# Patient Record
Sex: MALE | Race: WHITE | NOT HISPANIC OR LATINO | Employment: FULL TIME | URBAN - METROPOLITAN AREA
[De-identification: names, ages, dates, MRNs, and addresses within clinical notes are randomized per-mention and may not be internally consistent; named-entity substitution may affect disease eponyms.]

---

## 2018-11-13 ENCOUNTER — OFFICE VISIT CONVERTED (OUTPATIENT)
Dept: SURGERY | Facility: CLINIC | Age: 43
End: 2018-11-13
Attending: SURGERY

## 2018-11-13 ENCOUNTER — CONVERSION ENCOUNTER (OUTPATIENT)
Dept: SURGERY | Facility: CLINIC | Age: 43
End: 2018-11-13

## 2020-01-27 ENCOUNTER — OFFICE VISIT CONVERTED (OUTPATIENT)
Dept: FAMILY MEDICINE CLINIC | Facility: CLINIC | Age: 45
End: 2020-01-27
Attending: PHYSICIAN ASSISTANT

## 2020-01-27 ENCOUNTER — CONVERSION ENCOUNTER (OUTPATIENT)
Dept: FAMILY MEDICINE CLINIC | Facility: CLINIC | Age: 45
End: 2020-01-27

## 2020-01-30 ENCOUNTER — HOSPITAL ENCOUNTER (OUTPATIENT)
Dept: GENERAL RADIOLOGY | Facility: HOSPITAL | Age: 45
Discharge: HOME | End: 2020-01-30
Attending: PHYSICIAN ASSISTANT

## 2020-11-06 ENCOUNTER — HOSPITAL ENCOUNTER (OUTPATIENT)
Dept: URGENT CARE | Facility: CLINIC | Age: 45
Discharge: HOME | End: 2020-11-06

## 2021-04-05 ENCOUNTER — HOSPITAL ENCOUNTER (OUTPATIENT)
Dept: URGENT CARE | Facility: CLINIC | Age: 46
Discharge: HOME | End: 2021-04-05
Attending: FAMILY MEDICINE

## 2021-05-15 VITALS
BODY MASS INDEX: 30.88 KG/M2 | TEMPERATURE: 98 F | HEIGHT: 72 IN | SYSTOLIC BLOOD PRESSURE: 154 MMHG | WEIGHT: 228 LBS | DIASTOLIC BLOOD PRESSURE: 82 MMHG | HEART RATE: 112 BPM | OXYGEN SATURATION: 99 %

## 2021-05-16 VITALS — BODY MASS INDEX: 33.89 KG/M2 | RESPIRATION RATE: 18 BRPM | WEIGHT: 250.25 LBS | HEIGHT: 72 IN

## 2021-08-20 PROCEDURE — U0003 INFECTIOUS AGENT DETECTION BY NUCLEIC ACID (DNA OR RNA); SEVERE ACUTE RESPIRATORY SYNDROME CORONAVIRUS 2 (SARS-COV-2) (CORONAVIRUS DISEASE [COVID-19]), AMPLIFIED PROBE TECHNIQUE, MAKING USE OF HIGH THROUGHPUT TECHNOLOGIES AS DESCRIBED BY CMS-2020-01-R: HCPCS | Performed by: NURSE PRACTITIONER

## 2021-08-24 ENCOUNTER — TELEPHONE (OUTPATIENT)
Dept: OTHER | Facility: OTHER | Age: 46
End: 2021-08-24

## 2022-02-08 PROCEDURE — U0004 COV-19 TEST NON-CDC HGH THRU: HCPCS | Performed by: NURSE PRACTITIONER

## 2022-02-09 ENCOUNTER — TELEPHONE (OUTPATIENT)
Dept: URGENT CARE | Facility: CLINIC | Age: 47
End: 2022-02-09

## 2022-02-09 NOTE — TELEPHONE ENCOUNTER
----- Message from ANJU Matute sent at 2/9/2022  9:39 AM EST -----  Please notify patient of negative Covid result.

## 2023-11-14 ENCOUNTER — TELEPHONE (OUTPATIENT)
Dept: FAMILY MEDICINE CLINIC | Facility: CLINIC | Age: 48
End: 2023-11-14
Payer: COMMERCIAL

## 2023-11-14 NOTE — TELEPHONE ENCOUNTER
Left VM for patient to call back.      Unable to see at our office for WORKMANS COMP, we can only bill primary insurance. He will have to be seen elsewhere for workmans comp claim

## 2023-11-14 NOTE — TELEPHONE ENCOUNTER
Caller: Hong Fraser    Relationship: Self    Best call back number: [phone]    What is the best time to reach you: ANYTIME    Who are you requesting to speak with (clinical staff, provider,  specific staff member): NURSE     What was the call regarding: PATIENT IS A NEW PATIENT AND IT IS A WORKMANS COMP. PATIENT REQUESTED TO SEE JACKSON IQRA BECAUSE WORKMAN'S COMP WAS SENDING HIM TO A DOCTOR AND HE WENT TO SCHOOL WITH PCP.

## 2023-11-15 NOTE — TELEPHONE ENCOUNTER
Patient called back.  I explained we would only be able to bill his primary insurance.  He states this is ok with him and he would like to keep his appointment as scheduled.

## 2023-11-17 ENCOUNTER — TELEPHONE (OUTPATIENT)
Dept: FAMILY MEDICINE CLINIC | Facility: CLINIC | Age: 48
End: 2023-11-17
Payer: COMMERCIAL

## 2023-11-17 ENCOUNTER — OFFICE VISIT (OUTPATIENT)
Dept: FAMILY MEDICINE CLINIC | Facility: CLINIC | Age: 48
End: 2023-11-17
Payer: COMMERCIAL

## 2023-11-17 VITALS
BODY MASS INDEX: 33.62 KG/M2 | SYSTOLIC BLOOD PRESSURE: 173 MMHG | WEIGHT: 248.2 LBS | OXYGEN SATURATION: 99 % | DIASTOLIC BLOOD PRESSURE: 95 MMHG | HEART RATE: 86 BPM | TEMPERATURE: 98 F | HEIGHT: 72 IN

## 2023-11-17 DIAGNOSIS — Z76.89 ESTABLISHING CARE WITH NEW DOCTOR, ENCOUNTER FOR: Primary | ICD-10-CM

## 2023-11-17 DIAGNOSIS — M54.2 CERVICAL PAIN: ICD-10-CM

## 2023-11-17 DIAGNOSIS — Z79.4 TYPE 2 DIABETES MELLITUS WITH HYPERGLYCEMIA, WITH LONG-TERM CURRENT USE OF INSULIN: Primary | ICD-10-CM

## 2023-11-17 DIAGNOSIS — E78.5 HYPERLIPIDEMIA, UNSPECIFIED HYPERLIPIDEMIA TYPE: ICD-10-CM

## 2023-11-17 DIAGNOSIS — M54.50 LUMBAR PAIN: ICD-10-CM

## 2023-11-17 DIAGNOSIS — I10 ESSENTIAL HYPERTENSION: ICD-10-CM

## 2023-11-17 DIAGNOSIS — F17.220 CHEWING TOBACCO NICOTINE DEPENDENCE WITHOUT COMPLICATION: ICD-10-CM

## 2023-11-17 DIAGNOSIS — Z12.5 SCREENING FOR PROSTATE CANCER: ICD-10-CM

## 2023-11-17 DIAGNOSIS — E11.65 TYPE 2 DIABETES MELLITUS WITH HYPERGLYCEMIA, WITH LONG-TERM CURRENT USE OF INSULIN: Primary | ICD-10-CM

## 2023-11-17 DIAGNOSIS — E11.65 TYPE 2 DIABETES MELLITUS WITH HYPERGLYCEMIA, WITH LONG-TERM CURRENT USE OF INSULIN: ICD-10-CM

## 2023-11-17 DIAGNOSIS — M54.6 THORACIC BACK PAIN, UNSPECIFIED BACK PAIN LATERALITY, UNSPECIFIED CHRONICITY: ICD-10-CM

## 2023-11-17 DIAGNOSIS — Z12.11 SCREENING FOR COLON CANCER: ICD-10-CM

## 2023-11-17 DIAGNOSIS — Z79.4 TYPE 2 DIABETES MELLITUS WITH HYPERGLYCEMIA, WITH LONG-TERM CURRENT USE OF INSULIN: ICD-10-CM

## 2023-11-17 DIAGNOSIS — E66.9 OBESITY (BMI 30-39.9): ICD-10-CM

## 2023-11-17 RX ORDER — CYCLOBENZAPRINE HCL 10 MG
10 TABLET ORAL 3 TIMES DAILY PRN
Qty: 30 TABLET | Refills: 2 | Status: SHIPPED | OUTPATIENT
Start: 2023-11-17

## 2023-11-17 RX ORDER — PROCHLORPERAZINE 25 MG/1
1 SUPPOSITORY RECTAL DAILY
Qty: 1 EACH | Refills: 0 | Status: SHIPPED | OUTPATIENT
Start: 2023-11-17

## 2023-11-17 RX ORDER — FAMOTIDINE 20 MG/1
TABLET, FILM COATED ORAL
COMMUNITY
Start: 2023-09-11 | End: 2023-11-17

## 2023-11-17 RX ORDER — PROCHLORPERAZINE 25 MG/1
SUPPOSITORY RECTAL
Qty: 3 EACH | Refills: 1 | Status: SHIPPED | OUTPATIENT
Start: 2023-11-17

## 2023-11-17 RX ORDER — METOPROLOL SUCCINATE 100 MG/1
50 TABLET, EXTENDED RELEASE ORAL DAILY
COMMUNITY
End: 2023-11-17 | Stop reason: DRUGHIGH

## 2023-11-17 RX ORDER — METHOCARBAMOL 750 MG/1
750 TABLET, FILM COATED ORAL 4 TIMES DAILY PRN
Qty: 40 TABLET | Refills: 3 | Status: SHIPPED | OUTPATIENT
Start: 2023-11-17

## 2023-11-17 RX ORDER — PROCHLORPERAZINE 25 MG/1
SUPPOSITORY RECTAL
COMMUNITY
Start: 2023-11-08 | End: 2023-11-17 | Stop reason: SDUPTHER

## 2023-11-17 RX ORDER — LISINOPRIL 40 MG/1
40 TABLET ORAL DAILY
COMMUNITY
End: 2023-11-17 | Stop reason: SDUPTHER

## 2023-11-17 RX ORDER — ATORVASTATIN CALCIUM 20 MG/1
20 TABLET, FILM COATED ORAL DAILY
Qty: 90 TABLET | Refills: 3 | Status: SHIPPED | OUTPATIENT
Start: 2023-11-17

## 2023-11-17 RX ORDER — LISINOPRIL 40 MG/1
40 TABLET ORAL DAILY
Qty: 90 TABLET | Refills: 3 | Status: SHIPPED | OUTPATIENT
Start: 2023-11-17

## 2023-11-17 RX ORDER — ATORVASTATIN CALCIUM 20 MG/1
20 TABLET, FILM COATED ORAL DAILY
COMMUNITY
End: 2023-11-17 | Stop reason: SDUPTHER

## 2023-11-17 RX ORDER — METOPROLOL SUCCINATE 50 MG/1
50 TABLET, EXTENDED RELEASE ORAL 2 TIMES DAILY
Qty: 60 TABLET | Refills: 5 | Status: SHIPPED | OUTPATIENT
Start: 2023-11-17

## 2023-11-17 RX ORDER — PROCHLORPERAZINE 25 MG/1
SUPPOSITORY RECTAL
COMMUNITY
Start: 2023-11-07 | End: 2023-11-17 | Stop reason: SDUPTHER

## 2023-11-17 RX ORDER — DIPHENHYDRAMINE HCL 25 MG
CAPSULE ORAL
COMMUNITY
Start: 2023-09-11 | End: 2023-11-17

## 2023-11-17 RX ORDER — TESTOSTERONE 20.25 MG/1.25G
GEL TOPICAL
COMMUNITY
Start: 2023-09-11

## 2023-11-17 RX ORDER — DICLOFENAC SODIUM 75 MG/1
75 TABLET, DELAYED RELEASE ORAL 2 TIMES DAILY
Qty: 60 TABLET | Refills: 2 | Status: SHIPPED | OUTPATIENT
Start: 2023-11-17

## 2023-11-17 NOTE — ASSESSMENT & PLAN NOTE
Patient's (Body mass index is 33.66 kg/m².) indicates that they are obese (BMI >30) with health conditions that include hypertension and diabetes mellitus . Weight is newly identified. BMI  is above average; BMI management plan is completed. We discussed portion control and increasing exercise.
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
## 2023-11-17 NOTE — ASSESSMENT & PLAN NOTE
Hypertension is  newly identified, elevated .  Continue current treatment regimen.  Dietary sodium restriction.  Weight loss.  Regular aerobic exercise.  Continue current medications.  Ambulatory blood pressure monitoring.  Blood pressure will be reassessed at the next regular appointment. Keep all FU with cardiology.
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
## 2023-11-17 NOTE — ASSESSMENT & PLAN NOTE
Flexeril 10mg PO tid prn may take at night and when not working   Robaxin 750mg PO qid prn   Diclofenac 75mg PO bid   Referral to neurosurgeon   Increase rest   Ice/heat 20 min TID prn   Consider PT

## 2023-11-17 NOTE — ASSESSMENT & PLAN NOTE
Diabetes is newly identified.   Continue current treatment regimen.  Diabetes will be reassessed in 3 months.
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
## 2023-11-17 NOTE — TELEPHONE ENCOUNTER
Caller: Hong Fraser    Relationship: Self    Best call back number: [phone]     Requested Prescriptions:   Requested Prescriptions      No prescriptions requested or ordered in this encounter      HUMALOG INSULIN VIALS FOR DIABETIC PUMP  QTY OF 3    HUMALOG INSULIN PENS  BOX    OMNIPODS - THE ACTUAL PODS  AT LEAST QTY OF 15 PER MONTH  90 DAY SUPPLY    Pharmacy where request should be sent: [facility] [address] - [phone]  - [phone]     Last office visit with prescribing clinician: 11/17/2023   Last telemedicine visit with prescribing clinician: Visit date not found   Next office visit with prescribing clinician: 12/1/2023     Additional details provided by patient: PATIENT WOULD LIKE 90 DAY SUPPLY ON ALL    Does the patient have less than a 3 day supply:  [] Yes  [x] No    Carolina Velazco Rep   11/17/23 12:13 EST

## 2023-11-17 NOTE — PATIENT INSTRUCTIONS
Type 2 Diabetes Mellitus, Self-Care, Adult  When you have type 2 diabetes (type 2 diabetes mellitus), you must make sure your blood sugar (glucose) stays in a healthy range. You can do this with:  Nutrition.  Exercise.  Lifestyle changes.  Medicines or insulin, if needed.  Support from your doctors and others.  What are the risks?  Having type 2 diabetes can raise your risk for other long-term (chronic) health problems. You may get medicines to help prevent these problems.  How to stay aware of your blood sugar    Check your blood sugar level every day, as often as told.  Have your A1C (hemoglobin A1C) level checked two or more times a year. Have it checked more often if told.  Your doctor will set personal treatment goals for you. In general, you should have these blood sugar levels:  Before meals:  mg/dL (4.4-7.2 mmol/L).  After meals: below 180 mg/dL (10 mmol/L).  A1C: less than 7%.  How to manage high and low blood sugar  Symptoms of high blood sugar  High blood sugar is also called hyperglycemia. Know the symptoms of high blood sugar. These may include:  More thirst.  Hunger.  Feeling very tired.  Needing to pee (urinate) more often than normal.  Seeing things blurry.  Symptoms of low blood sugar  Low blood sugar is also called hypoglycemia. This is when blood sugar is at or below 70 mg/dL (3.9 mmol/L). Symptoms may include:  Hunger.  Feeling worried or nervous (anxious).  Feeling sweaty and cold to the touch (clammy).  Being dizzy or light-headed.  Feeling sleepy.  A fast heartbeat.  Feeling grouchy (irritable).  Tingling or loss of feeling (numbness) around your mouth, lips, or tongue.  Restless sleep.  Diabetes medicines can cause low blood sugar. You are more at risk:  While you exercise.  After exercise.  During sleep.  When you are sick.  When you skip meals or do not eat for a long time.  Treating low blood sugar  If you think you have low blood sugar, eat or drink something sugary right away. Keep  15 grams of a fast-acting carb (carbohydrate) with you all the time. Make sure your family and friends know how to treat you if you cannot treat yourself.  Treating very low blood sugar  Severe hypoglycemia is when your blood sugar is at or below 54 mg/dL (3 mmol/L).  Severe hypoglycemia is an emergency. Get medical help right away. Call your local emergency services (911 in the [place]  Do not wait to see if the symptoms will go away.  Do not drive yourself to the hospital.  You may need a glucagon shot if you have very low blood sugar and you cannot eat or drink. Have a family member or friend learn how to check your blood sugar and how to give you a glucagon shot. Ask your doctor if you should have a kit for glucagon shots.  Follow these instructions at home:  Medicines  Take prescribed insulin or diabetes medicines as told by your health care provider.  Do not run out of insulin or other medicines. Plan ahead.  If you use insulin, change the amount you take based on how active you are and what foods you eat. Your doctor will tell you how to do this.  Take over-the-counter and prescription medicines only as told by your doctor.  Eating and drinking    Eat healthy foods. These include:  Low-fat (lean) proteins.  Complex carbs, such as whole grains.  Fresh fruits and vegetables.  Low-fat dairy products.  Healthy fats.  Meet with a food expert (dietitian) to make an eating plan.  Follow instructions from your doctor about what you cannot eat or drink.  Drink enough fluid to keep your pee (urine) pale yellow.  Keep track of carbs that you eat. Read food labels and learn serving sizes of foods.  Follow your sick-day plan when you cannot eat or drink as normal. Make this plan with your doctor so it is ready to use.  Activity  Exercise as told by your doctor. You may need to:  Do stretching and strength exercises two or more times a week.  Do 150 minutes or more of exercise each week that makes your heart beat faster and  makes you sweat.  Spread out your exercise over 3 or more days a week.  Do not go more than 2 days in a row without exercise.  Talk with your doctor before you start a new exercise. Your doctor may tell you to change:  How much insulin or medicines you take.  How much food you eat.  Lifestyle  Do not smoke or use any products that contain nicotine or tobacco. If you need help quitting, ask your doctor.  If you drink alcohol and your doctor says that it is safe for you:  Limit how much you have to:  0-1 drink a day for women who are not pregnant.  0-2 drinks a day for men.  Know how much alcohol is in your drink. In the [place] one drink equals one 12 oz bottle of beer (355 mL), one 5 oz glass of wine (148 mL), or one 1½ oz glass of hard liquor (44 mL).  Learn to deal with stress. If you need help, ask your doctor.  Body care    Stay up to date with your shots (immunizations).  Have your eyes and feet checked by a doctor as often as told.  Check your skin and feet every day. Check for cuts, bruises, redness, blisters, or sores.  Brush your teeth and gums two times a day. Floss one or more times a day.  Go to the dentist one or more times every 6 months.  Stay at a healthy weight.  General instructions  Share your diabetes care plan with:  Your work or school.  People you live with.  Carry a card or wear jewelry that says you have diabetes.  Keep all follow-up visits.  Questions to ask your doctor  Do I need to meet with a certified expert in diabetes education and care?  Where can I find a support group?  Where to find more information  For help and guidance and more information about diabetes, please go to:  American Diabetes Association: www.diabetes.org  American Association of Diabetes Care and Education Specialists: www.diabeteseducator.org  International Diabetes Federation: www.idf.org  Summary  When you have type 2 diabetes, you must make sure your blood sugar (glucose) stays in a healthy range. You can do this  with nutrition, exercise, medicines and insulin, and support from doctors and others.  Check your blood sugar every day, or as often as told.  Having diabetes can raise your risk for other long-term health problems. You may get medicines to help prevent these problems.  Share your diabetes management plan with people at work, school, and home.  Keep all follow-up visits.  This information is not intended to replace advice given to you by your health care provider. Make sure you discuss any questions you have with your health care provider.  Document Revised: 03/14/2022 Document Reviewed: 03/14/2022  Elsevier Patient Education © 2023 Elsevier Inc.

## 2023-11-20 ENCOUNTER — TELEPHONE (OUTPATIENT)
Dept: FAMILY MEDICINE CLINIC | Facility: CLINIC | Age: 48
End: 2023-11-20
Payer: COMMERCIAL

## 2023-11-20 ENCOUNTER — LAB (OUTPATIENT)
Dept: LAB | Facility: HOSPITAL | Age: 48
End: 2023-11-20
Payer: COMMERCIAL

## 2023-11-20 DIAGNOSIS — E11.65 TYPE 2 DIABETES MELLITUS WITH HYPERGLYCEMIA, WITH LONG-TERM CURRENT USE OF INSULIN: ICD-10-CM

## 2023-11-20 DIAGNOSIS — Z12.5 SCREENING FOR PROSTATE CANCER: ICD-10-CM

## 2023-11-20 DIAGNOSIS — Z79.4 TYPE 2 DIABETES MELLITUS WITH HYPERGLYCEMIA, WITH LONG-TERM CURRENT USE OF INSULIN: ICD-10-CM

## 2023-11-20 LAB
ALBUMIN UR-MCNC: 153 MG/DL
BASOPHILS # BLD AUTO: 0.04 10*3/MM3 (ref 0–0.2)
BASOPHILS NFR BLD AUTO: 0.6 % (ref 0–1.5)
CREAT UR-MCNC: 234.9 MG/DL
DEPRECATED RDW RBC AUTO: 37.8 FL (ref 37–54)
EOSINOPHIL # BLD AUTO: 0.13 10*3/MM3 (ref 0–0.4)
EOSINOPHIL NFR BLD AUTO: 2 % (ref 0.3–6.2)
ERYTHROCYTE [DISTWIDTH] IN BLOOD BY AUTOMATED COUNT: 12.6 % (ref 12.3–15.4)
HCT VFR BLD AUTO: 45.7 % (ref 37.5–51)
HGB BLD-MCNC: 15.9 G/DL (ref 13–17.7)
IMM GRANULOCYTES # BLD AUTO: 0.03 10*3/MM3 (ref 0–0.05)
IMM GRANULOCYTES NFR BLD AUTO: 0.5 % (ref 0–0.5)
LYMPHOCYTES # BLD AUTO: 2.01 10*3/MM3 (ref 0.7–3.1)
LYMPHOCYTES NFR BLD AUTO: 30.5 % (ref 19.6–45.3)
MCH RBC QN AUTO: 29.3 PG (ref 26.6–33)
MCHC RBC AUTO-ENTMCNC: 34.8 G/DL (ref 31.5–35.7)
MCV RBC AUTO: 84.3 FL (ref 79–97)
MICROALBUMIN/CREAT UR: 651.3 MG/G (ref 0–29)
MONOCYTES # BLD AUTO: 0.48 10*3/MM3 (ref 0.1–0.9)
MONOCYTES NFR BLD AUTO: 7.3 % (ref 5–12)
NEUTROPHILS NFR BLD AUTO: 3.9 10*3/MM3 (ref 1.7–7)
NEUTROPHILS NFR BLD AUTO: 59.1 % (ref 42.7–76)
NRBC BLD AUTO-RTO: 0 /100 WBC (ref 0–0.2)
PLATELET # BLD AUTO: 282 10*3/MM3 (ref 140–450)
PMV BLD AUTO: 10.4 FL (ref 6–12)
RBC # BLD AUTO: 5.42 10*6/MM3 (ref 4.14–5.8)
WBC NRBC COR # BLD AUTO: 6.59 10*3/MM3 (ref 3.4–10.8)

## 2023-11-20 PROCEDURE — 82043 UR ALBUMIN QUANTITATIVE: CPT

## 2023-11-20 PROCEDURE — 80061 LIPID PANEL: CPT

## 2023-11-20 PROCEDURE — G0103 PSA SCREENING: HCPCS

## 2023-11-20 PROCEDURE — 85025 COMPLETE CBC W/AUTO DIFF WBC: CPT

## 2023-11-20 PROCEDURE — 84681 ASSAY OF C-PEPTIDE: CPT

## 2023-11-20 PROCEDURE — 84443 ASSAY THYROID STIM HORMONE: CPT

## 2023-11-20 PROCEDURE — 36415 COLL VENOUS BLD VENIPUNCTURE: CPT

## 2023-11-20 PROCEDURE — 83036 HEMOGLOBIN GLYCOSYLATED A1C: CPT

## 2023-11-20 PROCEDURE — 82570 ASSAY OF URINE CREATININE: CPT

## 2023-11-20 PROCEDURE — 80053 COMPREHEN METABOLIC PANEL: CPT
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
## 2023-11-21 ENCOUNTER — TELEPHONE (OUTPATIENT)
Dept: FAMILY MEDICINE CLINIC | Facility: CLINIC | Age: 48
End: 2023-11-21
Payer: COMMERCIAL

## 2023-11-21 DIAGNOSIS — Z79.4 TYPE 2 DIABETES MELLITUS WITH HYPERGLYCEMIA, WITH LONG-TERM CURRENT USE OF INSULIN: Primary | ICD-10-CM

## 2023-11-21 DIAGNOSIS — E11.65 TYPE 2 DIABETES MELLITUS WITH HYPERGLYCEMIA, WITH LONG-TERM CURRENT USE OF INSULIN: Primary | ICD-10-CM

## 2023-11-21 LAB
ALBUMIN SERPL-MCNC: 3.8 G/DL (ref 3.5–5.2)
ALBUMIN/GLOB SERPL: 1.4 G/DL
ALP SERPL-CCNC: 74 U/L (ref 39–117)
ALT SERPL W P-5'-P-CCNC: 31 U/L (ref 1–41)
ANION GAP SERPL CALCULATED.3IONS-SCNC: 12.5 MMOL/L (ref 5–15)
AST SERPL-CCNC: 38 U/L (ref 1–40)
BILIRUB SERPL-MCNC: 0.4 MG/DL (ref 0–1.2)
BUN SERPL-MCNC: 14 MG/DL (ref 6–20)
BUN/CREAT SERPL: 13.1 (ref 7–25)
CALCIUM SPEC-SCNC: 9.6 MG/DL (ref 8.6–10.5)
CHLORIDE SERPL-SCNC: 104 MMOL/L (ref 98–107)
CHOLEST SERPL-MCNC: 189 MG/DL (ref 0–200)
CO2 SERPL-SCNC: 23.5 MMOL/L (ref 22–29)
CREAT SERPL-MCNC: 1.07 MG/DL (ref 0.76–1.27)
EGFRCR SERPLBLD CKD-EPI 2021: 85.6 ML/MIN/1.73
GLOBULIN UR ELPH-MCNC: 2.8 GM/DL
GLUCOSE SERPL-MCNC: 81 MG/DL (ref 65–99)
HBA1C MFR BLD: 11.1 % (ref 4.8–5.6)
HDLC SERPL-MCNC: 44 MG/DL (ref 40–60)
LDLC SERPL CALC-MCNC: 121 MG/DL (ref 0–100)
LDLC/HDLC SERPL: 2.69 {RATIO}
POTASSIUM SERPL-SCNC: 4.2 MMOL/L (ref 3.5–5.2)
PROT SERPL-MCNC: 6.6 G/DL (ref 6–8.5)
PSA SERPL-MCNC: 0.72 NG/ML (ref 0–4)
SODIUM SERPL-SCNC: 140 MMOL/L (ref 136–145)
TRIGL SERPL-MCNC: 133 MG/DL (ref 0–150)
TSH SERPL DL<=0.05 MIU/L-ACNC: 0.8 UIU/ML (ref 0.27–4.2)
VLDLC SERPL-MCNC: 24 MG/DL (ref 5–40)

## 2023-11-21 RX ORDER — INSULIN PUMP CONTROLLER
1 EACH MISCELLANEOUS EVERY OTHER DAY
Qty: 15 EACH | Refills: 0 | Status: SHIPPED | OUTPATIENT
Start: 2023-11-21

## 2023-11-21 NOTE — TELEPHONE ENCOUNTER
Patient out of omnipods for insulin pump. Asking for month supply until appointment with Jenna Marie    Sending to [facility] pharmacy
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
## 2023-11-22 LAB — C PEPTIDE SERPL-MCNC: 0.9 NG/ML (ref 1.1–4.4)

## 2023-11-22 NOTE — TELEPHONE ENCOUNTER
Patient is going to be out of his Omnipod prior to his apt with davidson on 12/7/23. If you could send him in some to bridge him to his apt with Basia Marie until then please.

## 2023-11-28 DIAGNOSIS — Z79.4 TYPE 2 DIABETES MELLITUS WITH HYPERGLYCEMIA, WITH LONG-TERM CURRENT USE OF INSULIN: ICD-10-CM

## 2023-11-28 DIAGNOSIS — E11.65 TYPE 2 DIABETES MELLITUS WITH HYPERGLYCEMIA, WITH LONG-TERM CURRENT USE OF INSULIN: ICD-10-CM

## 2023-11-28 RX ORDER — INSULIN PUMP CONTROLLER
1 EACH MISCELLANEOUS EVERY OTHER DAY
Qty: 15 EACH | Refills: 5 | Status: SHIPPED | OUTPATIENT
Start: 2023-11-28

## 2023-12-01 ENCOUNTER — OFFICE VISIT (OUTPATIENT)
Dept: FAMILY MEDICINE CLINIC | Facility: CLINIC | Age: 48
End: 2023-12-01
Payer: COMMERCIAL

## 2023-12-01 VITALS
OXYGEN SATURATION: 98 % | HEART RATE: 81 BPM | SYSTOLIC BLOOD PRESSURE: 154 MMHG | WEIGHT: 252.2 LBS | BODY MASS INDEX: 34.16 KG/M2 | DIASTOLIC BLOOD PRESSURE: 81 MMHG | HEIGHT: 72 IN | TEMPERATURE: 98 F

## 2023-12-01 DIAGNOSIS — E78.5 HYPERLIPIDEMIA, UNSPECIFIED HYPERLIPIDEMIA TYPE: ICD-10-CM

## 2023-12-01 DIAGNOSIS — Z79.4 TYPE 2 DIABETES MELLITUS WITH HYPERGLYCEMIA, WITH LONG-TERM CURRENT USE OF INSULIN: ICD-10-CM

## 2023-12-01 DIAGNOSIS — F17.220 CHEWING TOBACCO NICOTINE DEPENDENCE WITHOUT COMPLICATION: ICD-10-CM

## 2023-12-01 DIAGNOSIS — M54.2 CERVICAL PAIN: Primary | ICD-10-CM

## 2023-12-01 DIAGNOSIS — M54.6 THORACIC BACK PAIN, UNSPECIFIED BACK PAIN LATERALITY, UNSPECIFIED CHRONICITY: ICD-10-CM

## 2023-12-01 DIAGNOSIS — M54.50 LUMBAR PAIN: ICD-10-CM

## 2023-12-01 DIAGNOSIS — E11.65 TYPE 2 DIABETES MELLITUS WITH HYPERGLYCEMIA, WITH LONG-TERM CURRENT USE OF INSULIN: ICD-10-CM

## 2023-12-01 DIAGNOSIS — E66.9 OBESITY (BMI 30-39.9): ICD-10-CM

## 2023-12-01 DIAGNOSIS — I10 ESSENTIAL HYPERTENSION: ICD-10-CM

## 2023-12-01 PROCEDURE — 1159F MED LIST DOCD IN RCRD: CPT | Performed by: NURSE PRACTITIONER

## 2023-12-01 PROCEDURE — 99214 OFFICE O/P EST MOD 30 MIN: CPT | Performed by: NURSE PRACTITIONER

## 2023-12-01 PROCEDURE — 3077F SYST BP >= 140 MM HG: CPT | Performed by: NURSE PRACTITIONER

## 2023-12-01 PROCEDURE — 3079F DIAST BP 80-89 MM HG: CPT | Performed by: NURSE PRACTITIONER

## 2023-12-01 PROCEDURE — 1160F RVW MEDS BY RX/DR IN RCRD: CPT | Performed by: NURSE PRACTITIONER

## 2023-12-01 PROCEDURE — 3046F HEMOGLOBIN A1C LEVEL >9.0%: CPT | Performed by: NURSE PRACTITIONER

## 2023-12-01 RX ORDER — PROCHLORPERAZINE 25 MG/1
1 SUPPOSITORY RECTAL DAILY
Qty: 1 EACH | Refills: 0 | Status: SHIPPED | OUTPATIENT
Start: 2023-12-01
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
## 2023-12-01 NOTE — ASSESSMENT & PLAN NOTE
Hypertension is  elevated in office . Pt states he has white coat syn. Advised he will check at home daily and let us know results.   Continue current treatment regimen.  Dietary sodium restriction.  Weight loss.  Regular aerobic exercise.  Ambulatory blood pressure monitoring.  Blood pressure will be reassessed at the next regular appointment.

## 2023-12-01 NOTE — ASSESSMENT & PLAN NOTE
Diabetes is improving with treatment.   Continue current treatment regimen.  Reminded to bring in blood sugar diary at next visit.  Dietary recommendations for ADA diet.  Regular aerobic exercise.  Diabetes will be reassessed in 3 months. Keep upcoming appt with endo.

## 2023-12-01 NOTE — ASSESSMENT & PLAN NOTE
Patient's (Body mass index is 34.2 kg/m².) indicates that they are obese (BMI >30) with health conditions that include hypertension and diabetes mellitus . Weight is newly identified. BMI  is above average; BMI management plan is completed. We discussed portion control and increasing exercise.

## 2023-12-06 ENCOUNTER — PATIENT ROUNDING (BHMG ONLY) (OUTPATIENT)
Dept: FAMILY MEDICINE CLINIC | Facility: CLINIC | Age: 48
End: 2023-12-06
Payer: COMMERCIAL

## 2023-12-06 NOTE — PROGRESS NOTES
A My-Chart message has been sent to the patient for PATIENT R[place]ING with [facility] [place].
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
## 2023-12-07 ENCOUNTER — TELEPHONE (OUTPATIENT)
Dept: DIABETES SERVICES | Facility: HOSPITAL | Age: 48
End: 2023-12-07
Payer: COMMERCIAL

## 2023-12-07 ENCOUNTER — OFFICE VISIT (OUTPATIENT)
Dept: DIABETES SERVICES | Facility: HOSPITAL | Age: 48
End: 2023-12-07
Payer: COMMERCIAL

## 2023-12-07 VITALS
DIASTOLIC BLOOD PRESSURE: 78 MMHG | HEART RATE: 86 BPM | HEIGHT: 72 IN | SYSTOLIC BLOOD PRESSURE: 169 MMHG | OXYGEN SATURATION: 99 % | BODY MASS INDEX: 33.46 KG/M2 | WEIGHT: 247 LBS

## 2023-12-07 DIAGNOSIS — E10.22 TYPE 1 DIABETES WITH STAGE 2 CHRONIC KIDNEY DISEASE GFR 60-89: ICD-10-CM

## 2023-12-07 DIAGNOSIS — E10.65 UNCONTROLLED TYPE 1 DIABETES MELLITUS WITH HYPERGLYCEMIA: ICD-10-CM

## 2023-12-07 DIAGNOSIS — N18.2 TYPE 1 DIABETES WITH STAGE 2 CHRONIC KIDNEY DISEASE GFR 60-89: ICD-10-CM

## 2023-12-07 DIAGNOSIS — Z97.8 USES SELF-APPLIED CONTINUOUS GLUCOSE MONITORING DEVICE: ICD-10-CM

## 2023-12-07 DIAGNOSIS — Z96.41 INSULIN PUMP IN PLACE: ICD-10-CM

## 2023-12-07 DIAGNOSIS — E66.9 OBESITY (BMI 30-39.9): ICD-10-CM

## 2023-12-07 DIAGNOSIS — E10.40 TYPE 1 DIABETES MELLITUS WITH DIABETIC NEUROPATHY: ICD-10-CM

## 2023-12-07 DIAGNOSIS — E13.9 LADA (LATENT AUTOIMMUNE DIABETES IN ADULTS), MANAGED AS TYPE 1: Primary | ICD-10-CM

## 2023-12-07 LAB — GLUCOSE BLDC GLUCOMTR-MCNC: 254 MG/DL (ref 70–99)

## 2023-12-07 PROCEDURE — 3077F SYST BP >= 140 MM HG: CPT | Performed by: NURSE PRACTITIONER

## 2023-12-07 PROCEDURE — 3046F HEMOGLOBIN A1C LEVEL >9.0%: CPT | Performed by: NURSE PRACTITIONER

## 2023-12-07 PROCEDURE — 99204 OFFICE O/P NEW MOD 45 MIN: CPT | Performed by: NURSE PRACTITIONER

## 2023-12-07 PROCEDURE — G0463 HOSPITAL OUTPT CLINIC VISIT: HCPCS | Performed by: NURSE PRACTITIONER

## 2023-12-07 PROCEDURE — 3078F DIAST BP <80 MM HG: CPT | Performed by: NURSE PRACTITIONER

## 2023-12-07 PROCEDURE — 95251 CONT GLUC MNTR ANALYSIS I&R: CPT | Performed by: NURSE PRACTITIONER

## 2023-12-07 PROCEDURE — 82948 REAGENT STRIP/BLOOD GLUCOSE: CPT | Performed by: NURSE PRACTITIONER

## 2023-12-07 PROCEDURE — 1159F MED LIST DOCD IN RCRD: CPT | Performed by: NURSE PRACTITIONER

## 2023-12-07 PROCEDURE — 1160F RVW MEDS BY RX/DR IN RCRD: CPT | Performed by: NURSE PRACTITIONER

## 2023-12-07 RX ORDER — INSULIN PMP CART,AUT,G6/7,CNTR
1 EACH SUBCUTANEOUS ONCE
Qty: 1 KIT | Refills: 0 | Status: SHIPPED | OUTPATIENT
Start: 2023-12-07 | End: 2023-12-07

## 2023-12-07 RX ORDER — INSULIN PMP CART,AUT,G6/7,CNTR
1 EACH SUBCUTANEOUS
Qty: 10 EACH | Refills: 5 | Status: SHIPPED | OUTPATIENT
Start: 2023-12-07

## 2023-12-07 RX ORDER — INSULIN LISPRO 200 [IU]/ML
150 INJECTION, SOLUTION SUBCUTANEOUS DAILY
Qty: 66 ML | Refills: 1 | Status: SHIPPED | OUTPATIENT
Start: 2023-12-07 | End: 2024-06-04

## 2023-12-07 NOTE — TELEPHONE ENCOUNTER
LIBRA FRYE Key: [id number] - [place] Case ID: [id number] - Rx #: [id number]  HumaLOG KwikPen 200UNIT/ML pen-injectorsApprovedtoday  The request has been approved. The authorization is effective from 12/07/2023 to 12/06/2024, as long as the member is enrolled in their current health plan. The request was approved as submitted. A written notification letter will follow with additional details.

## 2023-12-12 ENCOUNTER — OFFICE VISIT (OUTPATIENT)
Dept: NEUROSURGERY | Facility: CLINIC | Age: 48
End: 2023-12-12
Payer: OTHER MISCELLANEOUS

## 2023-12-12 VITALS
HEART RATE: 97 BPM | SYSTOLIC BLOOD PRESSURE: 155 MMHG | DIASTOLIC BLOOD PRESSURE: 83 MMHG | BODY MASS INDEX: 33.86 KG/M2 | HEIGHT: 72 IN | WEIGHT: 250 LBS

## 2023-12-12 DIAGNOSIS — M47.816 LUMBAR FACET ARTHROPATHY: ICD-10-CM

## 2023-12-12 DIAGNOSIS — M43.16 SPONDYLOLISTHESIS AT L4-L5 LEVEL: ICD-10-CM

## 2023-12-12 DIAGNOSIS — M51.36 DDD (DEGENERATIVE DISC DISEASE), LUMBAR: Primary | ICD-10-CM

## 2023-12-12 DIAGNOSIS — M43.06 PARS DEFECT OF LUMBAR SPINE: ICD-10-CM

## 2023-12-12 DIAGNOSIS — M54.2 CERVICAL PAIN: ICD-10-CM

## 2023-12-12 DIAGNOSIS — M50.30 DDD (DEGENERATIVE DISC DISEASE), CERVICAL: ICD-10-CM

## 2023-12-12 RX ORDER — DICLOFENAC SODIUM 30 MG/G
GEL TOPICAL 2 TIMES DAILY
Qty: 100 G | Refills: 2 | Status: SHIPPED | OUTPATIENT
Start: 2023-12-12

## 2023-12-12 RX ORDER — IBUPROFEN 800 MG/1
800 TABLET ORAL EVERY 8 HOURS PRN
Qty: 90 TABLET | Refills: 2 | Status: SHIPPED | OUTPATIENT
Start: 2023-12-12
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
## 2023-12-13 ENCOUNTER — PATIENT ROUNDING (BHMG ONLY) (OUTPATIENT)
Dept: NEUROSURGERY | Facility: CLINIC | Age: 48
End: 2023-12-13
Payer: COMMERCIAL

## 2023-12-13 ENCOUNTER — TELEPHONE (OUTPATIENT)
Dept: NEUROSURGERY | Facility: OTHER | Age: 48
End: 2023-12-13
Payer: COMMERCIAL

## 2023-12-13 NOTE — TELEPHONE ENCOUNTER
GUADALUPE FROM [facility]/  CALLED IN AND STATED THAT SHE ALSO NEEDS THE ORDERS FOR PT AND PM AS WELL AS THE MRI CERVICAL AND LUMBAR-     FAX-  [phone]- ATTN- [id number]   [phone]     CAN BE SENT ASAP- MRI'S WILL NEED TO BE SCHEDULED THROUGH [facility]    PLEASE ADVISE- THANK YOU.

## 2023-12-13 NOTE — TELEPHONE ENCOUNTER
Caller: GUADALUPE    Relationship: Payer- [employer]/ [employer]    Best call back number: [phone]    What form or medical record are you requesting: LAST OV NOTES- 12/12/23- WORK STATUS    Who is requesting this form or medical record from you: [employer]    How would you like to receive the form or medical records (pick-up, mail, fax): FAX  If fax, what is the fax number: [phone]- ATTN- [id number]    Timeframe paperwork needed: ASAP    PLEASE ADVISE- THANK YOU

## 2023-12-15 NOTE — TELEPHONE ENCOUNTER
ERICA WITH [employer] THEY DO THE SCHEDULING FOR WORK COMP CLAIMS STATES THEY NEED THE MRI L SPINE ORDER FAXED TO THEM TO FAX #[phone]    STATES THEY RECEIVED THE MRI C SPINE ORDER    PLEASE CALL ERICA WITH ANY QUESTIONS AT PHONE NUMBER [phone]

## 2023-12-15 NOTE — TELEPHONE ENCOUNTER
Caller: GUADALUPE    Relationship to patient: Payer    Best call back number: [phone]    Patient is needing: GUADALUPE WITH [employer] CALLED, STATES THEY NEED A MEDICAL NECESSITY LETTER STATING WHY PATIENT NEEDS AQUATIC THERAPY VERSUS REGULAR THERAPY. GUADALUPE ALSO STATES SHE IS NEEDING THE WORK STATUS FORM FILLED OUT, THEY CANNOT ACCEPT THE LAST OV NOTE AS THE WORK STATUS FORM. PLEASE REVIEW, PLEASE CALL GUADALUPE WITH ANY QUESTIONS.    THANK YOU

## 2023-12-21 ENCOUNTER — TELEPHONE (OUTPATIENT)
Dept: NEUROSURGERY | Facility: CLINIC | Age: 48
End: 2023-12-21
Payer: COMMERCIAL

## 2023-12-21 ENCOUNTER — TELEPHONE (OUTPATIENT)
Dept: DIABETES SERVICES | Facility: HOSPITAL | Age: 48
End: 2023-12-21
Payer: COMMERCIAL

## 2023-12-21 NOTE — TELEPHONE ENCOUNTER
I telephoned patient and left voicemail regarding Omnipod 5 insulin pump. I explained he needed to open the box and follow the instructions and place the code august in when it asked for a code. I left my call back number.

## 2023-12-21 NOTE — TELEPHONE ENCOUNTER
Patient left a voicemail stating MRI has been scheduled at [facility]. He stated they did not have the order for the flexion/extension x-rays, and requested that be faxed. I have faxed it to [phone].

## 2023-12-27 ENCOUNTER — EDUCATION (OUTPATIENT)
Dept: DIABETES SERVICES | Facility: HOSPITAL | Age: 48
End: 2023-12-27
Payer: COMMERCIAL

## 2023-12-27 DIAGNOSIS — E10.9 DIABETES MELLITUS TYPE 1, CONTROLLED, WITHOUT COMPLICATIONS: Primary | ICD-10-CM

## 2023-12-27 NOTE — PROGRESS NOTES
Hong Fraser 48 y.o. presents for OmniPod insulin pump instructions. Insulin pump training check list completed.  Patient stated they have viewed some of the insulin pump videos.  Patient was able to maneuver pump screens without difficulty.  Patient applied insulin pump insertion site without difficulty 15-15 rule for treatment of low blood glucose was reviewed with patient. Patient was explained to always have back up supplies including blood glucose meter, strips, lancets, basal insulin, short acting insulin pens or vials with insulin syringes and emergency glucagon. Patient was given DSME staff contact information and encouraged patient to contact staff with questions or concerns.    Insulin pump settings are as follows:  12 AM to 12 AM basal rate 1.85 units/h, target blood glucose 120 correction factor I unit per 70 points above target, insulin carb ratio 1 unit per 8 carbs.  Duration of insulin action 3 hours, maximum bolus 25 and maximum basal rate 3 units/h    Time started: 9:30    Time ended: 11:00    Amy Fernandez [facility], ALICE, [facility]12/27/2023

## 2023-12-28 DIAGNOSIS — M43.16 SPONDYLOLISTHESIS AT L4-L5 LEVEL: ICD-10-CM

## 2023-12-28 DIAGNOSIS — M43.06 PARS DEFECT OF LUMBAR SPINE: ICD-10-CM

## 2023-12-29 ENCOUNTER — PATIENT MESSAGE (OUTPATIENT)
Dept: NEUROSURGERY | Facility: CLINIC | Age: 48
End: 2023-12-29
Payer: COMMERCIAL

## 2023-12-29 ENCOUNTER — TELEPHONE (OUTPATIENT)
Dept: NEUROSURGERY | Facility: CLINIC | Age: 48
End: 2023-12-29
Payer: COMMERCIAL

## 2024-01-02 DIAGNOSIS — M50.30 DDD (DEGENERATIVE DISC DISEASE), CERVICAL: ICD-10-CM

## 2024-01-02 DIAGNOSIS — M47.816 LUMBAR FACET ARTHROPATHY: ICD-10-CM

## 2024-01-02 DIAGNOSIS — M54.2 CERVICAL PAIN: ICD-10-CM

## 2024-01-02 DIAGNOSIS — M43.06 PARS DEFECT OF LUMBAR SPINE: ICD-10-CM

## 2024-01-02 DIAGNOSIS — M51.36 DDD (DEGENERATIVE DISC DISEASE), LUMBAR: ICD-10-CM

## 2024-01-02 DIAGNOSIS — M43.16 SPONDYLOLISTHESIS AT L4-L5 LEVEL: ICD-10-CM

## 2024-01-03 ENCOUNTER — TELEPHONE (OUTPATIENT)
Dept: NEUROSURGERY | Facility: OTHER | Age: 49
End: 2024-01-03
Payer: COMMERCIAL

## 2024-01-03 NOTE — TELEPHONE ENCOUNTER
Caller: GUADALUPE    Relationship: Payer    Best call back number: [phone]    What form or medical record are you requesting: PAIN MANAGEMENT TREATMENT PLAN - TOO LIGHT- NEEDS TO BE DARKENED. - MEDICAL NECESSITY FORM FOR AQUA THERAPY INSTEAD OF PHYSICAL THERAPY-    Who is requesting this form or medical record from you: YESSICA W/PEPPER    How would you like to receive the form or medical records (pick-up, mail, fax): FAX  If fax, what is the fax number: [phone] ATTN- [id number]      Timeframe paperwork needed: ASAP    Additional notes: SHE STATED SHE RECEIVED THE REQUEST ON 12/13/23 AND SHE HAS BEEN TRYING TO OBTAIN THESE SINCE-    PLEASE ADVISE- THANK YOU.

## 2024-01-04 ENCOUNTER — TELEPHONE (OUTPATIENT)
Dept: DIABETES SERVICES | Facility: HOSPITAL | Age: 49
End: 2024-01-04

## 2024-01-04 ENCOUNTER — OFFICE VISIT (OUTPATIENT)
Dept: DIABETES SERVICES | Facility: HOSPITAL | Age: 49
End: 2024-01-04
Payer: COMMERCIAL

## 2024-01-04 VITALS
HEART RATE: 92 BPM | BODY MASS INDEX: 35.21 KG/M2 | OXYGEN SATURATION: 97 % | SYSTOLIC BLOOD PRESSURE: 180 MMHG | HEIGHT: 72 IN | WEIGHT: 260 LBS | DIASTOLIC BLOOD PRESSURE: 94 MMHG

## 2024-01-04 DIAGNOSIS — Z96.41 INSULIN PUMP IN PLACE: ICD-10-CM

## 2024-01-04 DIAGNOSIS — E13.40: ICD-10-CM

## 2024-01-04 DIAGNOSIS — E13.29 LATENT AUTOIMMUNE DIABETES MELLITUS IN ADULT (LADA) WITH MICROALBUMINURIA: ICD-10-CM

## 2024-01-04 DIAGNOSIS — Z97.8 USES SELF-APPLIED CONTINUOUS GLUCOSE MONITORING DEVICE: ICD-10-CM

## 2024-01-04 DIAGNOSIS — E66.01 SEVERE OBESITY (BMI 35.0-39.9) WITH COMORBIDITY: ICD-10-CM

## 2024-01-04 DIAGNOSIS — Z46.81 INSULIN PUMP TITRATION: ICD-10-CM

## 2024-01-04 DIAGNOSIS — R80.9 LATENT AUTOIMMUNE DIABETES MELLITUS IN ADULT (LADA) WITH MICROALBUMINURIA: ICD-10-CM

## 2024-01-04 DIAGNOSIS — E13.22 LATENT AUTOIMMUNE DIABETES MELLITUS IN ADULT (LADA) WITH CHRONIC KIDNEY DISEASE: ICD-10-CM

## 2024-01-04 DIAGNOSIS — E13.9 LADA (LATENT AUTOIMMUNE DIABETES IN ADULTS), MANAGED AS TYPE 1: Primary | ICD-10-CM

## 2024-01-04 LAB
EXPIRATION DATE: ABNORMAL
GLUCOSE BLDC GLUCOMTR-MCNC: 102 MG/DL (ref 70–99)
HBA1C MFR BLD: 10.9 % (ref 4.5–5.7)
Lab: ABNORMAL

## 2024-01-04 PROCEDURE — 3080F DIAST BP >= 90 MM HG: CPT | Performed by: NURSE PRACTITIONER

## 2024-01-04 PROCEDURE — 1160F RVW MEDS BY RX/DR IN RCRD: CPT | Performed by: NURSE PRACTITIONER

## 2024-01-04 PROCEDURE — 3077F SYST BP >= 140 MM HG: CPT | Performed by: NURSE PRACTITIONER

## 2024-01-04 PROCEDURE — 99214 OFFICE O/P EST MOD 30 MIN: CPT | Performed by: NURSE PRACTITIONER

## 2024-01-04 PROCEDURE — 3046F HEMOGLOBIN A1C LEVEL >9.0%: CPT | Performed by: NURSE PRACTITIONER

## 2024-01-04 PROCEDURE — 95251 CONT GLUC MNTR ANALYSIS I&R: CPT | Performed by: NURSE PRACTITIONER

## 2024-01-04 PROCEDURE — G0463 HOSPITAL OUTPT CLINIC VISIT: HCPCS | Performed by: NURSE PRACTITIONER

## 2024-01-04 PROCEDURE — 82948 REAGENT STRIP/BLOOD GLUCOSE: CPT | Performed by: NURSE PRACTITIONER

## 2024-01-04 PROCEDURE — 1159F MED LIST DOCD IN RCRD: CPT | Performed by: NURSE PRACTITIONER

## 2024-01-04 RX ORDER — GLUCAGON INJECTION, SOLUTION 1 MG/.2ML
1 INJECTION, SOLUTION SUBCUTANEOUS AS NEEDED
Qty: 0.2 ML | Refills: 2 | Status: SHIPPED | OUTPATIENT
Start: 2024-01-04

## 2024-01-04 RX ORDER — SEMAGLUTIDE 0.68 MG/ML
0.5 INJECTION, SOLUTION SUBCUTANEOUS WEEKLY
Qty: 9 ML | Refills: 1 | Status: SHIPPED | OUTPATIENT
Start: 2024-01-04
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
## 2024-01-04 NOTE — TELEPHONE ENCOUNTER
Approvedtoday  The request has been approved. The authorization is effective from 01/04/2024 to 01/03/2025, as long as the member is enrolled in their current health plan. The request was approved as submitted. A written notification letter will follow with additional details.LIBRA FRYE Key: [id number] - [place] Case ID: [id number] - Rx #: [id number]  Gvoke HypoPen 2-Pack 1MG/0.2ML auto-injectors

## 2024-01-09 ENCOUNTER — OFFICE VISIT (OUTPATIENT)
Dept: NEUROSURGERY | Facility: CLINIC | Age: 49
End: 2024-01-09
Payer: OTHER MISCELLANEOUS

## 2024-01-09 VITALS
BODY MASS INDEX: 34.95 KG/M2 | DIASTOLIC BLOOD PRESSURE: 94 MMHG | HEIGHT: 72 IN | HEART RATE: 105 BPM | SYSTOLIC BLOOD PRESSURE: 174 MMHG | WEIGHT: 258 LBS

## 2024-01-09 DIAGNOSIS — M43.06 PARS DEFECT OF LUMBAR SPINE: ICD-10-CM

## 2024-01-09 DIAGNOSIS — M50.30 DDD (DEGENERATIVE DISC DISEASE), CERVICAL: ICD-10-CM

## 2024-01-09 DIAGNOSIS — M54.2 CERVICAL PAIN: ICD-10-CM

## 2024-01-09 DIAGNOSIS — M47.816 LUMBAR FACET ARTHROPATHY: ICD-10-CM

## 2024-01-09 DIAGNOSIS — M43.16 SPONDYLOLISTHESIS AT L4-L5 LEVEL: ICD-10-CM

## 2024-01-09 DIAGNOSIS — M51.36 DDD (DEGENERATIVE DISC DISEASE), LUMBAR: Primary | ICD-10-CM

## 2024-01-16 ENCOUNTER — TELEPHONE (OUTPATIENT)
Dept: FAMILY MEDICINE CLINIC | Facility: CLINIC | Age: 49
End: 2024-01-16
Payer: COMMERCIAL

## 2024-02-07 ENCOUNTER — TELEPHONE (OUTPATIENT)
Dept: DIABETES SERVICES | Facility: HOSPITAL | Age: 49
End: 2024-02-07
Payer: COMMERCIAL

## 2024-02-07 NOTE — TELEPHONE ENCOUNTER
Patient called into clinic and stated that he is getting a epidural tomorrow morning on 2-8-2024 at 8:10 in the morning and needs to know if any settings on omnipod need to be changed.

## 2024-02-15 ENCOUNTER — TELEPHONE (OUTPATIENT)
Dept: DIABETES SERVICES | Facility: HOSPITAL | Age: 49
End: 2024-02-15
Payer: COMMERCIAL

## 2024-02-22 ENCOUNTER — OFFICE VISIT (OUTPATIENT)
Dept: DIABETES SERVICES | Facility: HOSPITAL | Age: 49
End: 2024-02-22
Payer: COMMERCIAL

## 2024-02-22 VITALS
BODY MASS INDEX: 35.62 KG/M2 | DIASTOLIC BLOOD PRESSURE: 89 MMHG | SYSTOLIC BLOOD PRESSURE: 156 MMHG | WEIGHT: 263 LBS | OXYGEN SATURATION: 99 % | HEIGHT: 72 IN | HEART RATE: 87 BPM

## 2024-02-22 DIAGNOSIS — Z97.8 USES SELF-APPLIED CONTINUOUS GLUCOSE MONITORING DEVICE: ICD-10-CM

## 2024-02-22 DIAGNOSIS — Z96.41 INSULIN PUMP IN PLACE: ICD-10-CM

## 2024-02-22 DIAGNOSIS — Z46.81 INSULIN PUMP TITRATION: ICD-10-CM

## 2024-02-22 DIAGNOSIS — R80.9 LATENT AUTOIMMUNE DIABETES MELLITUS IN ADULT (LADA) WITH MICROALBUMINURIA: ICD-10-CM

## 2024-02-22 DIAGNOSIS — E13.9 LADA (LATENT AUTOIMMUNE DIABETES IN ADULTS), MANAGED AS TYPE 1: ICD-10-CM

## 2024-02-22 DIAGNOSIS — E10.65 UNCONTROLLED TYPE 1 DIABETES MELLITUS WITH HYPERGLYCEMIA: Primary | ICD-10-CM

## 2024-02-22 DIAGNOSIS — E13.29 LATENT AUTOIMMUNE DIABETES MELLITUS IN ADULT (LADA) WITH MICROALBUMINURIA: ICD-10-CM

## 2024-02-22 DIAGNOSIS — E13.40: ICD-10-CM

## 2024-02-22 DIAGNOSIS — E13.22 LATENT AUTOIMMUNE DIABETES MELLITUS IN ADULT (LADA) WITH CHRONIC KIDNEY DISEASE: ICD-10-CM

## 2024-02-22 LAB
EXPIRATION DATE: ABNORMAL
GLUCOSE BLDC GLUCOMTR-MCNC: 236 MG/DL (ref 70–99)
HBA1C MFR BLD: 8 % (ref 4.5–5.7)
Lab: ABNORMAL

## 2024-02-22 PROCEDURE — 3079F DIAST BP 80-89 MM HG: CPT | Performed by: NURSE PRACTITIONER

## 2024-02-22 PROCEDURE — 1160F RVW MEDS BY RX/DR IN RCRD: CPT | Performed by: NURSE PRACTITIONER

## 2024-02-22 PROCEDURE — G0463 HOSPITAL OUTPT CLINIC VISIT: HCPCS | Performed by: NURSE PRACTITIONER

## 2024-02-22 PROCEDURE — 3077F SYST BP >= 140 MM HG: CPT | Performed by: NURSE PRACTITIONER

## 2024-02-22 PROCEDURE — 99214 OFFICE O/P EST MOD 30 MIN: CPT | Performed by: NURSE PRACTITIONER

## 2024-02-22 PROCEDURE — 1159F MED LIST DOCD IN RCRD: CPT | Performed by: NURSE PRACTITIONER

## 2024-02-22 PROCEDURE — 95251 CONT GLUC MNTR ANALYSIS I&R: CPT | Performed by: NURSE PRACTITIONER

## 2024-02-22 PROCEDURE — 3052F HG A1C>EQUAL 8.0%<EQUAL 9.0%: CPT | Performed by: NURSE PRACTITIONER

## 2024-02-22 PROCEDURE — 82948 REAGENT STRIP/BLOOD GLUCOSE: CPT | Performed by: NURSE PRACTITIONER

## 2024-02-22 RX ORDER — MELOXICAM 15 MG/1
1 TABLET ORAL DAILY
COMMUNITY
Start: 2024-01-24

## 2024-04-18 DIAGNOSIS — M51.36 DDD (DEGENERATIVE DISC DISEASE), LUMBAR: ICD-10-CM

## 2024-04-18 RX ORDER — DICLOFENAC SODIUM 30 MG/G
GEL TOPICAL
Qty: 100 G | Refills: 2 | OUTPATIENT
Start: 2024-04-18

## 2024-04-29 RX ORDER — PROCHLORPERAZINE 25 MG/1
SUPPOSITORY RECTAL
Qty: 3 EACH | Refills: 1 | Status: SHIPPED | OUTPATIENT
Start: 2024-04-29

## 2024-05-20 NOTE — PROGRESS NOTES
Chief Complaint  Diabetes (Follow up, med mgt, A1c eval, cgm/pump eval )    Referred By: Krystyna Collins,*    Subjective          Hong John Evelio presents to [facility] for diabetes medication management    History of Present Illness    Visit type:  follow-up  Diabetes type:  Type 2  Current diabetes status/concerns/issues:  No concerns with his diabetes  Other health concerns:  He had some issues with pedal edema  Current Diabetes symptoms:    Polyuria: No   Polydipsia: No   Polyphagia: No   Blurred vision: No   Excessive fatigue: No  Known Diabetes complications:  Neuropathy: Numbness and Shooting Pain; Location: Feet  Renal: Stage II mild (GFR = 60-89 mL/min) and Microalbuminuria  Eyes: No current eye exam available in record and Other: 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  Amputation/Wounds: None  GI: None  Cardiovascular: Hypertension and Hyperlipidemia  ED: Patient Reported; he uses injection therapy  Other: None  Hypoglycemia:   He had a low event when he had lots of s/s of low but his glucose was in the 70s  Hypoglycemia Symptoms:  No hypoglycemia at this time  Current diabetes treatment:   Omnipod insulin pump using Humalog U200 insulin and Ozempic 0.5 mg once weekly   Blood glucose device:  Dexcom CGM  Blood glucose monitoring frequency:  Continuous per CGM  Blood glucose range/average:  The 14-day sensor report shows an average glucose of 210 mg/dL, with 36% in target range ( mgdL), 40% in the high range (181-250 mg/dL), 24% in the very high range (>250 mg/dL), 0% in the low range (54-70 mg/dL) and 0% in the very low range (<54 mg/dL).   Glucose Source: Device Reviewed  Diet:  Carbohydrate Counting, Limits high carb/sweet foods, Avoids sugary  drinks  Activity/Exercise:   active with work    Past Medical History:   Diagnosis Date    Diabetes mellitus     History of asthma     History of depression     Hyperlipidemia     Hypertension     Tachycardia      Past Surgical History:   Procedure Laterality Date    HAND SURGERY Left     x2; traumatic injury    HERNIA REPAIR      umbilical    LIPOMA EXCISION      right hip    WISDOM TOOTH EXTRACTION       Family History   Problem Relation Age of Onset    COPD Mother     Lung cancer Mother     Hyperlipidemia Father     Hypertension Father     Coronary artery disease Father     COPD Father     Diabetes Brother      Social History     Socioeconomic History    Marital status:     Number of children: 2   Tobacco Use    Smoking status: Former     Current packs/day: 1.00     Types: Cigarettes     Passive exposure: Never    Smokeless tobacco: Former    Tobacco comments:     pouches   Vaping Use    Vaping status: Never Used   Substance and Sexual Activity    Alcohol use: Yes     Alcohol/week: 1.0 standard drink of alcohol     Types: 1 Cans of beer per week     Comment: occasionally 1 beer a week    Drug use: Never    Sexual activity: Yes     Partners: Female     No Known Allergies    Current Outpatient Medications:     atorvastatin (LIPITOR) 20 MG tablet, Take 1 tablet by mouth Daily., Disp: 90 tablet, Rfl: 3    Continuous Blood Gluc Transmit (Dexcom G6 Transmitter) misc, Inject 1 each under the skin into the appropriate area as directed Daily., Disp: 1 each, Rfl: 0    Continuous Glucose Sensor (Dexcom G6 Sensor), Inject under the skin into the appropriate area as directed Every 10 (Ten) Days., Disp: 3 each, Rfl: 1    Diclofenac Sodium 3 % gel gel, Apply  topically to the appropriate area as directed 2 (Two) Times a Day., Disp: 100 g, Rfl: 2    Glucagon (Gvoke HypoPen 2-Pack) 1 MG/0.2ML solution auto-injector, Inject 1 mg under the skin into the appropriate area as directed As Needed (Severe hypoglycemia)., Disp: 0.2  "mL, Rfl: 2    ibuprofen (ADVIL,MOTRIN) 800 MG tablet, Take 1 tablet by mouth Every 8 (Eight) Hours As Needed for Mild Pain., Disp: 90 tablet, Rfl: 2    Insulin Dispos  Accessories (Omnipod Pod Pals) misc, Use 1 each Every 30 (Thirty) Days., Disp: 1 each, Rfl: 11    Insulin Disposable Pump (Omnipod 5 G6 Pod, Gen 5,) misc, Use 1 each Every 3 (Three) Days., Disp: 10 each, Rfl: 5    Insulin Lispro (HumaLOG KwikPen) 200 UNIT/ML solution pen-injector, Inject 150 Units under the skin into the appropriate area as directed Daily for 180 days., Disp: 66 mL, Rfl: 1    lisinopril (PRINIVIL,ZESTRIL) 40 MG tablet, Take 1 tablet by mouth Daily., Disp: 90 tablet, Rfl: 3    meloxicam (MOBIC) 15 MG tablet, Take 1 tablet by mouth Daily., Disp: , Rfl:     metoprolol succinate XL (TOPROL-XL) 50 MG 24 hr tablet, Take 1 tablet by mouth 2 (Two) Times a Day., Disp: 60 tablet, Rfl: 5    Semaglutide,0.25 or 0.5MG/DOS, (Ozempic, 0.25 or 0.5 MG/DOSE,) 2 MG/3ML solution pen-injector, Inject 0.5 mg under the skin into the appropriate area as directed 1 (One) Time Per Week., Disp: 9 mL, Rfl: 1    Testosterone 1.62 % gel, APPLY ONE PUMPS WORTH OF GEL APPLIED TO AXILLA EVERY DAY, Disp: , Rfl:     Objective     Vitals:    05/21/24 0754   BP: 169/91   Pulse: 111   SpO2: 98%   Weight: 119 kg (262 lb)   Height: 182.9 cm (72\")   PainSc:   3     Body mass index is 35.53 kg/m².    Physical Exam  Constitutional:       Appearance: Normal appearance. He is obese.      Comments: Severe Obesity with Comorbidity ( BMI 35 - 39.9) Pt Current BMI = 35.53 with comorbidities of hypertension, hyperlipidemia, diabetes   HENT:      Head: Normocephalic and atraumatic.      Right Ear: External ear normal.      Left Ear: External ear normal.      Nose: Nose normal.   Eyes:      Extraocular Movements: Extraocular movements intact.      Conjunctiva/sclera: Conjunctivae normal.   Pulmonary:      Effort: Pulmonary effort is normal.   Musculoskeletal:         General: Normal " range of motion.      Cervical back: Normal range of motion.   Skin:     General: Skin is warm and dry.   Neurological:      General: No focal deficit present.      Mental Status: He is alert and oriented to person, place, and time. Mental status is at baseline.   Psychiatric:         Mood and Affect: Mood normal.         Behavior: Behavior normal.         Thought Content: Thought content normal.         Judgment: Judgment normal.             Result Review :   The following data was reviewed by: ANJU Humphreys on 05/13/2024:    Most Recent A1C          5/21/2024    07:58   HGBA1C Most Recent   Hemoglobin A1C 8.1        A1C Last 3 Results          1/4/2024    09:53 2/22/2024    14:14 5/21/2024    07:58   HGBA1C Last 3 Results   Hemoglobin A1C 10.9  8  8.1      A1c collected in the office today is 8.1%, indicating Uncontrolled Type II diabetes.  This result is up from the prior result of 8.0% collected on 2/22/2024    Glucose   Date Value Ref Range Status   05/21/2024 155 (H) 70 - 99 mg/dL Final     Comment:     Serial Number: [id number]:  [id number]     Point of care glucose in the office today is within normal limits for nonfasting glucose              Assessment: He has had a slight increase in his A1c.  His CGM report shows some postprandial hyperglycemia.  Some of this is related to delayed carbohydrate entry or failure to enter carbohydrates into the pump.      Diagnoses and all orders for this visit:    1. Uncontrolled type 1 diabetes mellitus with hyperglycemia (Primary)  -     POC Glycosylated Hemoglobin (Hb A1C)    2. 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 Latent autoimmune diabetes mellitus in adult (CHARLENE) with diabetic neuropathy    6. Insulin pump in place    7. Insulin pump titration    8. Uses  self-applied continuous glucose monitoring device    Other orders  -     POC Glucose        Plan: To help minimize the postprandial hyperglycemia the current sensitivity factor of 1-40 was reduced to 1-25.  Maximum bolus of 25 units was also increased to 30 units.  No other changes were made to the pump settings.  The patient will focus on accuracy of carbohydrate counting as well as timing of carbohydrate entry with every meal.    The patient will monitor his blood glucose levels using the CGM.  If he develops problematic hyperglycemia or hypoglycemia or adverse drug reactions, he will contact the office for further instructions.        Follow Up     Return in about 3 months (around 8/21/2024) for Pump Eval, CGM Follow-up.    Patient was given instructions and counseling regarding his condition or for health maintenance advice. Please see specific information pulled into the AVS if appropriate.     Jenna Marie, APRN  05/21/2024      Dictated Utilizing Dragon Dictation.  Please note that portions of this note were completed with a voice recognition program.  Part of this note may be an electronic transcription/translation of spoken language to printed text using the Dragon Dictation System.

## 2024-05-21 ENCOUNTER — OFFICE VISIT (OUTPATIENT)
Dept: DIABETES SERVICES | Facility: HOSPITAL | Age: 49
End: 2024-05-21
Payer: COMMERCIAL

## 2024-05-21 VITALS
HEIGHT: 72 IN | OXYGEN SATURATION: 98 % | DIASTOLIC BLOOD PRESSURE: 91 MMHG | HEART RATE: 111 BPM | BODY MASS INDEX: 35.49 KG/M2 | SYSTOLIC BLOOD PRESSURE: 169 MMHG | WEIGHT: 262 LBS

## 2024-05-21 DIAGNOSIS — E13.22 LATENT AUTOIMMUNE DIABETES MELLITUS IN ADULT (LADA) WITH CHRONIC KIDNEY DISEASE: ICD-10-CM

## 2024-05-21 DIAGNOSIS — E10.65 UNCONTROLLED TYPE 1 DIABETES MELLITUS WITH HYPERGLYCEMIA: Primary | ICD-10-CM

## 2024-05-21 DIAGNOSIS — E13.29 LATENT AUTOIMMUNE DIABETES MELLITUS IN ADULT (LADA) WITH MICROALBUMINURIA: ICD-10-CM

## 2024-05-21 DIAGNOSIS — Z97.8 USES SELF-APPLIED CONTINUOUS GLUCOSE MONITORING DEVICE: ICD-10-CM

## 2024-05-21 DIAGNOSIS — Z46.81 INSULIN PUMP TITRATION: ICD-10-CM

## 2024-05-21 DIAGNOSIS — E13.9 LADA (LATENT AUTOIMMUNE DIABETES IN ADULTS), MANAGED AS TYPE 1: ICD-10-CM

## 2024-05-21 DIAGNOSIS — R80.9 LATENT AUTOIMMUNE DIABETES MELLITUS IN ADULT (LADA) WITH MICROALBUMINURIA: ICD-10-CM

## 2024-05-21 DIAGNOSIS — E13.40: ICD-10-CM

## 2024-05-21 DIAGNOSIS — Z96.41 INSULIN PUMP IN PLACE: ICD-10-CM

## 2024-05-21 LAB
EXPIRATION DATE: ABNORMAL
GLUCOSE BLDC GLUCOMTR-MCNC: 155 MG/DL (ref 70–99)
HBA1C MFR BLD: 8.1 % (ref 4.5–5.7)
Lab: ABNORMAL

## 2024-05-21 PROCEDURE — G0463 HOSPITAL OUTPT CLINIC VISIT: HCPCS | Performed by: NURSE PRACTITIONER

## 2024-05-21 PROCEDURE — 99214 OFFICE O/P EST MOD 30 MIN: CPT | Performed by: NURSE PRACTITIONER

## 2024-05-21 PROCEDURE — 1160F RVW MEDS BY RX/DR IN RCRD: CPT | Performed by: NURSE PRACTITIONER

## 2024-05-21 PROCEDURE — 3080F DIAST BP >= 90 MM HG: CPT | Performed by: NURSE PRACTITIONER

## 2024-05-21 PROCEDURE — 82948 REAGENT STRIP/BLOOD GLUCOSE: CPT | Performed by: NURSE PRACTITIONER

## 2024-05-21 PROCEDURE — 95251 CONT GLUC MNTR ANALYSIS I&R: CPT | Performed by: NURSE PRACTITIONER

## 2024-05-21 PROCEDURE — 3077F SYST BP >= 140 MM HG: CPT | Performed by: NURSE PRACTITIONER

## 2024-05-21 PROCEDURE — 1159F MED LIST DOCD IN RCRD: CPT | Performed by: NURSE PRACTITIONER

## 2024-05-21 PROCEDURE — 3052F HG A1C>EQUAL 8.0%<EQUAL 9.0%: CPT | Performed by: NURSE PRACTITIONER

## 2024-05-21 PROCEDURE — 83036 HEMOGLOBIN GLYCOSYLATED A1C: CPT | Performed by: NURSE PRACTITIONER

## 2024-05-28 DIAGNOSIS — E10.65 UNCONTROLLED TYPE 1 DIABETES MELLITUS WITH HYPERGLYCEMIA: ICD-10-CM

## 2024-05-30 RX ORDER — INSULIN LISPRO 200 [IU]/ML
INJECTION, SOLUTION SUBCUTANEOUS
Qty: 66 ML | Refills: 1 | Status: SHIPPED | OUTPATIENT
Start: 2024-05-30

## 2024-06-28 DIAGNOSIS — E10.65 UNCONTROLLED TYPE 1 DIABETES MELLITUS WITH HYPERGLYCEMIA: ICD-10-CM

## 2024-06-28 RX ORDER — INSULIN PMP CART,AUT,G6/7,CNTR
1 EACH SUBCUTANEOUS
Qty: 10 EACH | Refills: 5 | Status: SHIPPED | OUTPATIENT
Start: 2024-06-28

## 2024-07-17 ENCOUNTER — OFFICE VISIT (OUTPATIENT)
Dept: SURGERY | Facility: CLINIC | Age: 49
End: 2024-07-17
Payer: COMMERCIAL

## 2024-07-17 ENCOUNTER — PREP FOR SURGERY (OUTPATIENT)
Dept: OTHER | Facility: HOSPITAL | Age: 49
End: 2024-07-17
Payer: COMMERCIAL

## 2024-07-17 VITALS
HEART RATE: 108 BPM | SYSTOLIC BLOOD PRESSURE: 188 MMHG | HEIGHT: 72 IN | BODY MASS INDEX: 35.49 KG/M2 | DIASTOLIC BLOOD PRESSURE: 110 MMHG | WEIGHT: 262 LBS

## 2024-07-17 DIAGNOSIS — Z12.11 SCREENING FOR MALIGNANT NEOPLASM OF COLON: Primary | ICD-10-CM

## 2024-07-17 RX ORDER — SODIUM CHLORIDE 0.9 % (FLUSH) 0.9 %
3 SYRINGE (ML) INJECTION EVERY 12 HOURS SCHEDULED
OUTPATIENT
Start: 2024-07-17

## 2024-07-17 RX ORDER — POLYETHYLENE GLYCOL 3350 17 G/17G
POWDER, FOR SOLUTION ORAL
Qty: 238 PACKET | Refills: 0 | Status: SHIPPED | OUTPATIENT
Start: 2024-07-17

## 2024-07-17 RX ORDER — SODIUM CHLORIDE 9 MG/ML
40 INJECTION, SOLUTION INTRAVENOUS AS NEEDED
OUTPATIENT
Start: 2024-07-17

## 2024-07-17 RX ORDER — SODIUM CHLORIDE 0.9 % (FLUSH) 0.9 %
10 SYRINGE (ML) INJECTION AS NEEDED
OUTPATIENT
Start: 2024-07-17

## 2024-07-17 NOTE — PROGRESS NOTES
Chief Complaint: Colonoscopy    Subjective      Colonoscopy consultation       History of Present Illness  Hong Fraser is a 49 y.o. male presents to [facility] for colonoscopy consultation.    Patient presents today on referral from Krystyna Bello for colonoscopy consultation.  Patient denies any abdominal pain, change in bowel habit, or rectal bleeding.  Denies any family history of colorectal cancer.  No previous colonoscopy.    Patient denies CRISTOPHER.     Recently was taking Ozempic but since then has stopped.    Patient is also with a cardiac valve disorder.  He is under the care of Dr. Adams I will get cardiac clearance prior to the procedure.    Objective     Past Medical History:   Diagnosis Date    Diabetes mellitus     History of asthma     History of depression     Hyperlipidemia     Hypertension     Tachycardia        Past Surgical History:   Procedure Laterality Date    EYE SURGERY      Lazer to fix bleeding    HAND SURGERY Left     x2; traumatic injury    HEMORRHOIDECTOMY      Only lancing    HERNIA REPAIR      umbilical    LIPOMA EXCISION      right hip    WISDOM TOOTH EXTRACTION         Outpatient Medications Marked as Taking for the 7/17/24 encounter (Office Visit) with Bree Esposito APRN   Medication Sig Dispense Refill    atorvastatin (LIPITOR) 20 MG tablet Take 1 tablet by mouth Daily. 90 tablet 3    Continuous Blood Gluc Transmit (Dexcom G6 Transmitter) misc Inject 1 each under the skin into the appropriate area as directed Daily. 1 each 0    Continuous Glucose Sensor (Dexcom G6 Sensor) Inject under the skin into the appropriate area as directed Every 10 (Ten) Days. 3 each 1    Glucagon (Gvoke HypoPen 2-Pack) 1 MG/0.2ML solution auto-injector Inject 1 mg under the skin into the appropriate area as directed As Needed (Severe hypoglycemia). 0.2 mL 2    HumaLOG KwikPen 200 UNIT/ML solution pen-injector Inject 150 Units under the skin into the  appropriate area as directed Daily 66 mL 1    ibuprofen (ADVIL,MOTRIN) 800 MG tablet Take 1 tablet by mouth Every 8 (Eight) Hours As Needed for Mild Pain. 90 tablet 2    Insulin Dispos  Accessories (Omnipod Pod Pals) misc Use 1 each Every 30 (Thirty) Days. 1 each 11    Insulin Disposable Pump (Omnipod 5 G6 Pods, Gen 5,) misc Use 1 each Every 3 (Three) Days. 10 each 5    lisinopril (PRINIVIL,ZESTRIL) 40 MG tablet Take 1 tablet by mouth Daily. 90 tablet 3    meloxicam (MOBIC) 15 MG tablet Take 1 tablet by mouth Daily.      metoprolol succinate XL (TOPROL-XL) 50 MG 24 hr tablet Take 1 tablet by mouth 2 (Two) Times a Day. 60 tablet 5    Testosterone 1.62 % gel APPLY ONE PUMPS WORTH OF GEL APPLIED TO AXILLA EVERY DAY         No Known Allergies     Family History   Problem Relation Age of Onset    COPD Mother     Lung cancer Mother     Cancer Mother     Miscarriages / Stillbirths Mother     Hyperlipidemia Father     Hypertension Father     Coronary artery disease Father     COPD Father     Alcohol abuse Father     Arthritis Father     Depression Father     Hearing loss Father     Heart disease Father     Mental illness Father     Diabetes Brother        Social History     Socioeconomic History    Marital status:     Number of children: 2   Tobacco Use    Smoking status: Former     Current packs/day: 1.00     Types: Cigarettes     Passive exposure: Never    Smokeless tobacco: Former    Tobacco comments:     Off and on   Vaping Use    Vaping status: Never Used   Substance and Sexual Activity    Alcohol use: Yes     Alcohol/week: 1.0 standard drink of alcohol     Types: 1 Cans of beer per week     Comment: Social    Drug use: Never    Sexual activity: Yes     Partners: Female       Review of Systems   Constitutional:  Negative for chills and fever.   Gastrointestinal:  Negative for abdominal distention, abdominal pain, anal bleeding, blood in stool, constipation, diarrhea and rectal pain.        Vital Signs:   BP  "(!) 188/110 (BP Location: Right arm, Patient Position: Sitting, Cuff Size: Adult)   Pulse 108   Ht 182.9 cm (72\")   Wt 119 kg (262 lb)   BMI 35.53 kg/m²      Physical Exam  Vitals and nursing note reviewed.   Constitutional:       General: He is not in acute distress.     Appearance: Normal appearance. He is not ill-appearing.   HENT:      Head: Normocephalic and atraumatic.   Cardiovascular:      Rate and Rhythm: Normal rate.   Pulmonary:      Effort: Pulmonary effort is normal.      Breath sounds: No stridor.   Abdominal:      Palpations: Abdomen is soft.      Tenderness: There is no guarding.   Musculoskeletal:         General: No deformity. Normal range of motion.   Skin:     General: Skin is warm and dry.      Coloration: Skin is not jaundiced.   Neurological:      General: No focal deficit present.      Mental Status: He is alert and oriented to person, place, and time.   Psychiatric:         Mood and Affect: Mood normal.         Thought Content: Thought content normal.          Result Review :          []  Laboratory  []  Radiology  []  Pathology  []  Microbiology  []  EKG/Telemetry   []  Cardiology/Vascular   []  Old records  I spent 15 minutes caring for Hong on this date of service. This time includes time spent by me in the following activities: reviewing tests, obtaining and/or reviewing a separately obtained history, performing a medically appropriate examination and/or evaluation, ordering medications, tests, or procedures, and documenting information in the medical record.     Assessment and Plan    Diagnoses and all orders for this visit:    1. Screening for malignant neoplasm of colon (Primary)    Other orders  -     polyethylene glycol (MIRALAX) 17 g packet; Take as directed.  Instructions given in office.  Dispense: 238 g bottle  Dispense: 238 packet; Refill: 0    Cardiac clearance Bryan    Patient does have a insulin pump      Follow Up   Return for Schedule colonoscopy with Dr." Syeda 9/9/2024 [facility].    Hospital arrival time: 0800.    Possible risks/complications, benefits, and alternatives to surgical or invasive procedures have been explained to patient and/or legal guardian.    Patient has been evaluated and can tolerate anesthesia and/or sedation. Risks, benefits, and alternatives to anesthesia and sedation have been explained to the patient and/or legal guardian. Patient verbalizes understanding and is willing to proceed with the above plan.     Patient was given instructions and counseling regarding his condition or for health maintenance advice. Please see specific information pulled into the AVS if appropriate.

## 2024-07-29 ENCOUNTER — TELEPHONE (OUTPATIENT)
Dept: FAMILY MEDICINE CLINIC | Facility: CLINIC | Age: 49
End: 2024-07-29

## 2024-07-29 RX ORDER — METOPROLOL SUCCINATE 50 MG/1
50 TABLET, EXTENDED RELEASE ORAL 2 TIMES DAILY
Qty: 60 TABLET | Refills: 5 | OUTPATIENT
Start: 2024-07-29

## 2024-07-29 RX ORDER — PROCHLORPERAZINE 25 MG/1
1 SUPPOSITORY RECTAL DAILY
Qty: 1 EACH | Refills: 0 | Status: SHIPPED | OUTPATIENT
Start: 2024-07-29

## 2024-07-29 RX ORDER — ATORVASTATIN CALCIUM 20 MG/1
20 TABLET, FILM COATED ORAL DAILY
Qty: 90 TABLET | Refills: 3 | Status: SHIPPED | OUTPATIENT
Start: 2024-07-29

## 2024-07-29 RX ORDER — PROCHLORPERAZINE 25 MG/1
SUPPOSITORY RECTAL
Qty: 3 EACH | Refills: 1 | Status: SHIPPED | OUTPATIENT
Start: 2024-07-29

## 2024-07-29 NOTE — TELEPHONE ENCOUNTER
Caller: Hong Fraser    Relationship: Self    Best call back number: [phone]    Requested Prescriptions:   Requested Prescriptions     Pending Prescriptions Disp Refills    lisinopril (PRINIVIL,ZESTRIL) 40 MG tablet 90 tablet 3     Sig: Take 1 tablet by mouth Daily.    metoprolol succinate XL (TOPROL-XL) 50 MG 24 hr tablet 60 tablet 5     Sig: Take 1 tablet by mouth 2 (Two) Times a Day.    atorvastatin (LIPITOR) 20 MG tablet 90 tablet 3     Sig: Take 1 tablet by mouth Daily.    Continuous Glucose Transmitter (Dexcom G6 Transmitter) misc 1 each 0     Sig: Inject 1 each under the skin into the appropriate area as directed Daily.    Continuous Glucose Sensor (Dexcom G6 Sensor) 3 each 1     Sig: Inject  under the skin into the appropriate area as directed Every 10 (Ten) Days.        Pharmacy where request should be sent: [facility] [address] - [phone] PH - [phone] FX     Last office visit with prescribing clinician: 12/1/2023   Last telemedicine visit with prescribing clinician: Visit date not found   Next office visit with prescribing clinician: Visit date not found     Additional details provided by patient: PATIENT IS REQUESTING ALL REFILLS BE FOR 90 DAY SUPPLY     Does the patient have less than a 3 day supply:  [] Yes  [x] No    Would you like a call back once the refill request has been completed: [] Yes [x] No    If the office needs to give you a call back, can they leave a voicemail: [] Yes [x] No    Carolina Li   07/29/24 15:56 EDT

## 2024-07-29 NOTE — TELEPHONE ENCOUNTER
Caller: Hong Fraser    Relationship: Self    Best call back number: [phone]     What medication are you requesting: LASIX - 40 MG - HALF IN THE MORNING AND HALF AT NIGHT    What are your current symptoms: SWELLING    If a prescription is needed, what is your preferred pharmacy and phone number: [facility] [place], [place] - [address] - [phone]  - [phone] FX     Additional notes:    PATIENT STATES HE WENT TO [place] [facility] FOR SWELLING. PATIENT STATE THEY PRESCRIBED THIS MEDICATION, AND HE CALLED HIS CARDIOLOGIST TO GET IN BUT THEY CAN'T SEE HIM UNTIL OCTOBER. PATIENT STATES HIS CARDIOLOGIST INSTRUCTED HIM TO CALL HIS PCP AND REQUEST THIS MEDICATION. PATIENT STATES IF HE NEEDS TO BE SEEN BEFORE IT CAN  BE PRESCRIBED IT WOULD HAVE TO BE BEFORE 7/31/24 BECAUSE THAT'S WHEN HE LOSES HIS INSURANCE.

## 2024-07-30 ENCOUNTER — TELEPHONE (OUTPATIENT)
Dept: FAMILY MEDICINE CLINIC | Facility: CLINIC | Age: 49
End: 2024-07-30
Payer: COMMERCIAL

## 2024-07-30 ENCOUNTER — TELEPHONE (OUTPATIENT)
Dept: DIABETES SERVICES | Facility: HOSPITAL | Age: 49
End: 2024-07-30
Payer: COMMERCIAL

## 2024-07-30 DIAGNOSIS — E10.65 UNCONTROLLED TYPE 1 DIABETES MELLITUS WITH HYPERGLYCEMIA: ICD-10-CM

## 2024-07-30 NOTE — TELEPHONE ENCOUNTER
Caller: Hong Fraser    Relationship: Self    Best call back number: [phone]    What is the best time to reach you: ANYTIME    Who are you requesting to speak with (clinical staff, provider,  specific staff member): NURSE     What was the call regarding: PATIENT IS WANTING TO FIND OUT WHAT IS GOING ON WITH THE REFILL REQUEST FOR HIS METOPROLOL PLEASE ADVISE

## 2024-07-30 NOTE — TELEPHONE ENCOUNTER
Patient calling because he is losing his insurance tomorrow and wants a 90 day supply of his Humalog insulin sent over to his pharmacy.

## 2024-07-31 ENCOUNTER — HOSPITAL ENCOUNTER (OUTPATIENT)
Dept: GENERAL RADIOLOGY | Facility: HOSPITAL | Age: 49
Discharge: HOME | End: 2024-07-31
Payer: COMMERCIAL

## 2024-07-31 ENCOUNTER — LAB (OUTPATIENT)
Dept: LAB | Facility: HOSPITAL | Age: 49
End: 2024-07-31
Payer: COMMERCIAL

## 2024-07-31 ENCOUNTER — OFFICE VISIT (OUTPATIENT)
Dept: FAMILY MEDICINE CLINIC | Facility: CLINIC | Age: 49
End: 2024-07-31
Payer: COMMERCIAL

## 2024-07-31 VITALS
TEMPERATURE: 97.3 F | RESPIRATION RATE: 18 BRPM | DIASTOLIC BLOOD PRESSURE: 77 MMHG | SYSTOLIC BLOOD PRESSURE: 160 MMHG | WEIGHT: 268 LBS | HEIGHT: 72 IN | HEART RATE: 83 BPM | OXYGEN SATURATION: 99 % | BODY MASS INDEX: 36.3 KG/M2

## 2024-07-31 DIAGNOSIS — S99.922A INJURY OF LEFT GREAT TOE, INITIAL ENCOUNTER: ICD-10-CM

## 2024-07-31 DIAGNOSIS — R06.00 DYSPNEA, UNSPECIFIED TYPE: ICD-10-CM

## 2024-07-31 DIAGNOSIS — E78.5 HYPERLIPIDEMIA, UNSPECIFIED HYPERLIPIDEMIA TYPE: ICD-10-CM

## 2024-07-31 DIAGNOSIS — R60.0 BILATERAL LEG EDEMA: Primary | ICD-10-CM

## 2024-07-31 DIAGNOSIS — E10.65 UNCONTROLLED TYPE 1 DIABETES MELLITUS WITH HYPERGLYCEMIA: ICD-10-CM

## 2024-07-31 DIAGNOSIS — I10 ESSENTIAL HYPERTENSION: ICD-10-CM

## 2024-07-31 DIAGNOSIS — R60.0 BILATERAL LEG EDEMA: ICD-10-CM

## 2024-07-31 LAB — NT-PROBNP SERPL-MCNC: <36 PG/ML (ref 0–450)

## 2024-07-31 PROCEDURE — 73630 X-RAY EXAM OF FOOT: CPT

## 2024-07-31 PROCEDURE — 83880 ASSAY OF NATRIURETIC PEPTIDE: CPT

## 2024-07-31 PROCEDURE — 3052F HG A1C>EQUAL 8.0%<EQUAL 9.0%: CPT

## 2024-07-31 PROCEDURE — 36415 COLL VENOUS BLD VENIPUNCTURE: CPT

## 2024-07-31 PROCEDURE — 3077F SYST BP >= 140 MM HG: CPT

## 2024-07-31 PROCEDURE — 99214 OFFICE O/P EST MOD 30 MIN: CPT

## 2024-07-31 PROCEDURE — 1125F AMNT PAIN NOTED PAIN PRSNT: CPT

## 2024-07-31 PROCEDURE — 3078F DIAST BP <80 MM HG: CPT

## 2024-07-31 RX ORDER — INSULIN LISPRO 200 [IU]/ML
150 INJECTION, SOLUTION SUBCUTANEOUS DAILY
Qty: 66 ML | Refills: 0 | Status: SHIPPED | OUTPATIENT
Start: 2024-07-31 | End: 2024-07-31 | Stop reason: SDUPTHER

## 2024-07-31 RX ORDER — LISINOPRIL 40 MG/1
40 TABLET ORAL DAILY
Qty: 90 TABLET | Refills: 3 | Status: SHIPPED | OUTPATIENT
Start: 2024-07-31

## 2024-07-31 RX ORDER — METOPROLOL SUCCINATE 50 MG/1
50 TABLET, EXTENDED RELEASE ORAL 2 TIMES DAILY
Qty: 60 TABLET | Refills: 5 | Status: SHIPPED | OUTPATIENT
Start: 2024-07-31

## 2024-07-31 RX ORDER — FUROSEMIDE 40 MG/1
40 TABLET ORAL 2 TIMES DAILY PRN
Qty: 60 TABLET | Refills: 0 | Status: SHIPPED | OUTPATIENT
Start: 2024-07-31

## 2024-07-31 RX ORDER — INSULIN LISPRO 200 [IU]/ML
150 INJECTION, SOLUTION SUBCUTANEOUS DAILY
Qty: 67 ML | Refills: 0 | Status: SHIPPED | OUTPATIENT
Start: 2024-07-31 | End: 2024-10-29

## 2024-07-31 NOTE — PROGRESS NOTES
Chief Complaint   Patient presents with    Edema     X1 week    Hypertension       Subjective          Hong Fraser presents to [facility]    History of Present Illness  Hong is here to be seen for edema in his legs. He went to the ER and was treated there for the last week. He has very little amount left in his legs but is out of the lasix and will be losing his insurance tomorrow. He has blood pressure issues and is on lisinopril and metoprolol. He had stopped taking those and then the edema occurred. He has been back on them for a week now. His BP today is coming back down and is 143/85. We discussed getting an echo as the ER suggested to have that looked at for right sided heart failure. He was informed these two medications are suggested to be using in conjunction for heart failure so staying on them may prevent the edema from reoccuring without needing an echo. His lungs and heart both sound WNL. If edema comes back again I would suggest following through with the echo and if it doesn't show anything next step would be vascular.       Past History:  Medical History: has a past medical history of Diabetes mellitus, History of asthma, History of depression, Hyperlipidemia, Hypertension, and Tachycardia.   Surgical History: has a past surgical history that includes Lipoma Excision; Hernia repair; Cassatt tooth extraction; Hand surgery (Left); Eye surgery; and Hemorrhoid surgery.   Family History: family history includes Alcohol abuse in his father; Arthritis in his father; COPD in his father and mother; Cancer in his mother; Coronary artery disease in his father; Depression in his father; Diabetes in his brother; Hearing loss in his father; Heart disease in his father; Hyperlipidemia in his father; Hypertension in his father; Lung cancer in his mother; Mental illness in his father; Miscarriages / Stillbirths in his mother.   Social History: reports that he quit smoking  "about 34 years ago. His smoking use included cigarettes. He has never been exposed to tobacco smoke. He has quit using smokeless tobacco. He reports current alcohol use of about 1.0 standard drink of alcohol per week. He reports that he does not use drugs.  Allergies: Patient has no known allergies.  (Not in a hospital admission)       Social History     Socioeconomic History    Marital status:     Number of children: 2   Tobacco Use    Smoking status: Former     Current packs/day: 0.00     Types: Cigarettes     Quit date:      Years since quittin.6     Passive exposure: Never    Smokeless tobacco: Former    Tobacco comments:     Off and on   Vaping Use    Vaping status: Never Used   Substance and Sexual Activity    Alcohol use: Yes     Alcohol/week: 1.0 standard drink of alcohol     Types: 1 Cans of beer per week     Comment: Social    Drug use: Never    Sexual activity: Yes     Partners: Female       Health Maintenance Due   Topic Date Due    COLORECTAL CANCER SCREENING  Never done    Pneumococcal Vaccine 0-64 (1 of 2 - PCV) Never done    Hepatitis B (1 of 3 - 19+ 3-dose series) Never done    COVID-19 Vaccine (1 - 2023-24 season) Never done    HEPATITIS C SCREENING  Never done    ANNUAL PHYSICAL  Never done       Objective     Vital Signs:   /77 (BP Location: Right arm, Patient Position: Sitting, Cuff Size: Adult)   Pulse 83   Temp 97.3 °F (36.3 °C) (Temporal)   Resp 18   Ht 182.9 cm (72\")   Wt 122 kg (268 lb)   SpO2 99%   BMI 36.35 kg/m²       Physical Exam  Constitutional:       Appearance: Normal appearance.   HENT:      Nose: Nose normal.      Mouth/Throat:      Mouth: Mucous membranes are moist.   Cardiovascular:      Rate and Rhythm: Normal rate and regular rhythm.      Pulses: Normal pulses.      Heart sounds: Normal heart sounds.   Pulmonary:      Effort: Pulmonary effort is normal.      Breath sounds: Normal breath sounds.   Musculoskeletal:      Right lower le+ Edema " present.      Left lower le+ Edema present.   Skin:     General: Skin is warm and dry.   Neurological:      General: No focal deficit present.      Mental Status: He is alert and oriented to person, place, and time.   Psychiatric:         Mood and Affect: Mood normal.         Behavior: Behavior normal.          Review of Systems   All other systems reviewed and are negative.       Result Review :                 Assessment and Plan    Diagnoses and all orders for this visit:    1. Bilateral leg edema (Primary)  -     furosemide (Lasix) 40 MG tablet; Take 1 tablet by mouth 2 (Two) Times a Day As Needed (leg swelling).  Dispense: 60 tablet; Refill: 0  -     Adult Transthoracic Echo Complete W/ Cont if Necessary Per Protocol; Future  -     proBNP; Future    2. Dyspnea, unspecified type  -     Adult Transthoracic Echo Complete W/ Cont if Necessary Per Protocol; Future  -     proBNP; Future    3. Injury of left great toe, initial encounter  -     XR Foot 3+ View Left; Future    4. Essential hypertension  Assessment & Plan:  Hypertension is stable and controlled  Continue current treatment regimen.  Blood pressure will be reassessed in 6 months.      5. Hyperlipidemia, unspecified hyperlipidemia type  Assessment & Plan:   Lipid abnormalities are stable    Plan:  Continue same medication/s without change.      Discussed medication dosage, use, side effects, and goals of treatment in detail.    Counseled patient on lifestyle modifications to help control hyperlipidemia.     Patient Treatment Goals:   LDL goal is less than 70    Followup in 6 months.            Pt thought to be clinically stable at this time.    Follow Up   Return if symptoms worsen or fail to improve.  Patient was given instructions and counseling regarding his condition or for health maintenance advice. Please see specific information pulled into the AVS if appropriate.       Answers submitted by the patient for this visit:  Primary Reason for Visit  (Submitted on 7/30/2024)  What is the primary reason for your visit?: Other  Other (Submitted on 7/30/2024)  Please describe your symptoms.: Pedal edema  Have you had these symptoms before?: No  How long have you been having these symptoms?: 1-2 weeks  Please list any medications you are currently taking for this condition.: Lasix  Please describe any probable cause for these symptoms. : Fatigue heaviness in lower extremities swelling

## 2024-08-01 ENCOUNTER — PATIENT ROUNDING (BHMG ONLY) (OUTPATIENT)
Dept: FAMILY MEDICINE CLINIC | Facility: CLINIC | Age: 49
End: 2024-08-01
Payer: COMMERCIAL

## 2024-08-07 ENCOUNTER — TELEPHONE (OUTPATIENT)
Dept: SURGERY | Facility: CLINIC | Age: 49
End: 2024-08-07
Payer: COMMERCIAL

## 2024-08-07 NOTE — TELEPHONE ENCOUNTER
PT WANTS TO CANCEL SCOPE THAT IS ON FOR 9/9 AND DOES NOT WANT TO R/S IT. HE HAS HAD A CHANGE IN INSURANCE.

## 2024-09-09 ENCOUNTER — TELEPHONE (OUTPATIENT)
Dept: DIABETES SERVICES | Facility: HOSPITAL | Age: 49
End: 2024-09-09
Payer: COMMERCIAL

## 2024-09-09 NOTE — TELEPHONE ENCOUNTER
Caller:     angie farmer ()       Relationship:[employer]     Callback number:     [phone] (Home)      Is it ok to leave a message: [x] Yes [] No    Requested medication for samples:     Continuous Glucose Transmitter (Dexcom G6 Transmitter) misc       How much medication does the patient currently have left:     Who will be picking up the samples: [employer]     Do you need information about patient financial assistance for this medication: [] Yes [] No    Additional details provided:

## 2024-09-11 NOTE — TELEPHONE ENCOUNTER
PT RETURNED CALL TO CHECK STATUS OF REQUEST. REQUESTING SAMPLE DUE TO GAP IN INSURANCE COVERAGE, RX WOULD BE PROHIBITIVELY EXPENSIVE.   PT WOULD LIKE TO INQUIRE IF G7 SENSOR COULD BE USED IN INTERIM UNTIL INSURANCE ISSUES RESOLVED. PLEASE REVIEW AND ADVISE.   PLEASE CALL BACK [phone]

## 2024-09-11 NOTE — TELEPHONE ENCOUNTER
Spoke with patient and explain the clinic did not have any sample G6 dexcom transmitters but the clinic had one donated by a patient.  He stated he would like to come pick one up the transmitter.

## 2024-12-02 ENCOUNTER — TELEPHONE (OUTPATIENT)
Dept: DIABETES SERVICES | Facility: HOSPITAL | Age: 49
End: 2024-12-02
Payer: COMMERCIAL

## 2024-12-02 NOTE — TELEPHONE ENCOUNTER
RECEIVED PA REQUEST FOR OZEMPIC. CALLS PT TO VERIFY IF HE IS TAKING MEDICATION SINCE IT WAS D/C IN JULY PER HIS CHART. PT STATES HE IS NOT BUT WOULD LIKE TO RESTART AND HOPES IT WILL NOT UPSET HIS STOMACH THIS TIME. ADVISED WE HAVE NOT SEEN HIM SINCE 5/2024 AND BECAUSE THE OZEMPIC CAUSED ABDOMNIAL DISCOMFORT HE WILL NEED TO BE SEEN IN OFFICE BEFORE A PA CAN BE SUBMITTED. PT VERBALIZED UNDERSTANDING AND IS AWARE HE HAS APPT ON12/6

## 2024-12-06 ENCOUNTER — PRIOR AUTHORIZATION (OUTPATIENT)
Dept: DIABETES SERVICES | Facility: HOSPITAL | Age: 49
End: 2024-12-06
Payer: COMMERCIAL

## 2024-12-06 ENCOUNTER — OFFICE VISIT (OUTPATIENT)
Dept: DIABETES SERVICES | Facility: HOSPITAL | Age: 49
End: 2024-12-06
Payer: COMMERCIAL

## 2024-12-06 VITALS
HEART RATE: 98 BPM | SYSTOLIC BLOOD PRESSURE: 156 MMHG | BODY MASS INDEX: 37.11 KG/M2 | OXYGEN SATURATION: 98 % | DIASTOLIC BLOOD PRESSURE: 80 MMHG | WEIGHT: 274 LBS | HEIGHT: 72 IN

## 2024-12-06 DIAGNOSIS — E13.9 LADA (LATENT AUTOIMMUNE DIABETES IN ADULTS), MANAGED AS TYPE 2: Primary | ICD-10-CM

## 2024-12-06 DIAGNOSIS — Z97.8 USES SELF-APPLIED CONTINUOUS GLUCOSE MONITORING DEVICE: ICD-10-CM

## 2024-12-06 DIAGNOSIS — E66.01 SEVERE OBESITY (BMI 35.0-39.9) WITH COMORBIDITY: ICD-10-CM

## 2024-12-06 DIAGNOSIS — Z96.41 INSULIN PUMP IN PLACE: ICD-10-CM

## 2024-12-06 DIAGNOSIS — E13.40: ICD-10-CM

## 2024-12-06 DIAGNOSIS — Z46.81 INSULIN PUMP TITRATION: ICD-10-CM

## 2024-12-06 LAB
EXPIRATION DATE: ABNORMAL
GLUCOSE BLDC GLUCOMTR-MCNC: 383 MG/DL (ref 70–99)
HBA1C MFR BLD: 10.2 % (ref 4.5–5.7)
Lab: ABNORMAL

## 2024-12-06 PROCEDURE — 82948 REAGENT STRIP/BLOOD GLUCOSE: CPT | Performed by: NURSE PRACTITIONER

## 2024-12-06 PROCEDURE — G0463 HOSPITAL OUTPT CLINIC VISIT: HCPCS | Performed by: NURSE PRACTITIONER

## 2024-12-06 PROCEDURE — 83036 HEMOGLOBIN GLYCOSYLATED A1C: CPT | Performed by: NURSE PRACTITIONER

## 2024-12-06 RX ORDER — PROCHLORPERAZINE 25 MG/1
1 SUPPOSITORY RECTAL DAILY
Qty: 1 EACH | Refills: 0 | Status: SHIPPED | OUTPATIENT
Start: 2024-12-06

## 2024-12-06 RX ORDER — CHLORTHALIDONE 25 MG/1
25 TABLET ORAL DAILY
COMMUNITY

## 2024-12-06 RX ORDER — INSULIN PMP CART,AUT,G6/7,CNTR
1 EACH SUBCUTANEOUS EVERY OTHER DAY
Qty: 15 EACH | Refills: 5 | Status: SHIPPED | OUTPATIENT
Start: 2024-12-06

## 2024-12-06 RX ORDER — DAPAGLIFLOZIN 10 MG/1
10 TABLET, FILM COATED ORAL DAILY
Qty: 90 TABLET | Refills: 1 | Status: SHIPPED | OUTPATIENT
Start: 2024-12-06 | End: 2025-06-04

## 2024-12-06 RX ORDER — PROCHLORPERAZINE 25 MG/1
SUPPOSITORY RECTAL
Qty: 3 EACH | Refills: 1 | Status: SHIPPED | OUTPATIENT
Start: 2024-12-06

## 2024-12-06 RX ORDER — INSULIN LISPRO 200 [IU]/ML
150 INJECTION, SOLUTION SUBCUTANEOUS DAILY
Qty: 67 ML | Refills: 1 | Status: SHIPPED | OUTPATIENT
Start: 2024-12-06 | End: 2025-06-04

## 2024-12-06 NOTE — PROGRESS NOTES
Chief Complaint  Med Management and Follow-up (A1c evaluation, cgm/pump evaluation)    Referred By: Krystyna Collins,*    Subjective     {Problem List  Visit Diagnosis   Encounters  Notes  Medications  Labs  Result Review Imaging  Media :23}     Patient or patient representative verbalized consent for the use of Ambient Listening during the visit with  ANJU Humphreys for chart documentation. 12/6/2024  15:38 TREY Fraser presents to [facility] for diabetes medication management    History of Present Illness    Visit type:  follow-up  Diabetes type:  Type 2  Current diabetes status/concerns/issues:     History of Present Illness  The patient presents for evaluation of diabetes.    He has experienced two changes in his insurance, which led to a period without insulin. His daughter procured 4 or 5 pens of U-200 insulin, which he ran out of two days ago. His current blood sugar level is 383, with an average glucose level of 281 as indicated by his sensor. He has not used his OmniPod for the past two days and requires refills for it. He changes his OmniPod every 2.5 to 3 days depending on his food intake and uses Humalog U-200 insulin in the pump. He discontinued Ozempic due to diarrhea. His A1c levels have been around 8 since starting Farxiga and Xigduo. He stopped taking metformin. He believes his baseline needs to be increased as his blood sugar spikes after eating and takes a long time to decrease.    He has gained 12 pounds and was recently diagnosed with congestive heart failure. He is also on a medication for high blood pressure that includes a diuretic and takes Lasix as needed. He has not experienced any side effects from Farxiga.    He has an upcoming appointment for a sleep apnea test. He has had issues with his Dexcom and needs to update it. He has an eye appointment scheduled for next weekend. 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    He has not been taking meloxicam as it did not help his back. He takes ibuprofen when his back flares up and causes his feet to hurt. He quit going to [facility] because they wanted to burn his nerves and put him on medicines that he does not want to be on.      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  Amputation/Wounds: None  GI: None  Cardiovascular: Hypertension and Hyperlipidemia and CHF  ED: Patient Reported; he uses injection therapy  Other: None  Hypoglycemia:  None reported at this time  Hypoglycemia Symptoms:  No hypoglycemia at this time  Current diabetes treatment:   Omnipod insulin pump using Humalog U200 insulin    Blood glucose device:  Dexcom CGM  Blood glucose monitoring frequency:  Continuous per CGM  Blood glucose range/average:  The 14-day sensor report shows an average glucose of 281 mg/dL, with 14% in target range ( mgdL), 29% in the high range (181-250 mg/dL), 57% in the very high range (>250 mg/dL), 0% in the low range (54-70 mg/dL) and 0% in the very low range (<54 mg/dL).   Glucose Source: Device Reviewed  Diet:  Carbohydrate Counting, Limits high carb/sweet foods, Avoids sugary drinks  Activity/Exercise:   active with work     Past Medical History:   Diagnosis Date    Congestive heart failure 10/14/2024    [facility]     Diabetes mellitus     History of asthma     History of depression     Hyperlipidemia     Hypertension     Tachycardia      Past Surgical History:   Procedure  Laterality Date    EYE SURGERY      Lazer to fix bleeding    HAND SURGERY Left     x2; traumatic injury    HEMORRHOIDECTOMY      Only lancing    HERNIA REPAIR      umbilical    LIPOMA EXCISION      right hip    WISDOM TOOTH EXTRACTION       Family History   Problem Relation Age of Onset    COPD Mother     Lung cancer Mother     Cancer Mother     Miscarriages / Stillbirths Mother     Hyperlipidemia Father     Hypertension Father     Coronary artery disease Father     COPD Father     Alcohol abuse Father     Arthritis Father     Depression Father     Hearing loss Father     Heart disease Father     Mental illness Father     Diabetes Brother      Social History     Socioeconomic History    Marital status:     Number of children: 2   Tobacco Use    Smoking status: Former     Current packs/day: 0.00     Types: Cigarettes     Quit date:      Years since quittin.9     Passive exposure: Never    Smokeless tobacco: Former    Tobacco comments:     Off and on   Vaping Use    Vaping status: Never Used   Substance and Sexual Activity    Alcohol use: Yes     Alcohol/week: 1.0 standard drink of alcohol     Types: 1 Cans of beer per week     Comment: Social    Drug use: Never    Sexual activity: Yes     Partners: Female     No Known Allergies    Current Outpatient Medications:     atorvastatin (LIPITOR) 20 MG tablet, Take 1 tablet by mouth Daily., Disp: 90 tablet, Rfl: 3    chlorthalidone (HYGROTON) 25 MG tablet, Take 1 tablet by mouth Daily., Disp: , Rfl:     Continuous Glucose Sensor (Dexcom G6 Sensor), Inject  under the skin into the appropriate area as directed Every 10 (Ten) Days., Disp: 3 each, Rfl: 1    Continuous Glucose Transmitter (Dexcom G6 Transmitter) misc, Inject 1 each under the skin into the appropriate area as directed Daily., Disp: 1 each, Rfl: 0    furosemide (Lasix) 40 MG tablet, Take 1 tablet by mouth 2 (Two) Times a Day As Needed (leg swelling)., Disp: 60 tablet, Rfl: 0    Glucagon (Gvoke  "HypoPen 2-Pack) 1 MG/0.2ML solution auto-injector, Inject 1 mg under the skin into the appropriate area as directed As Needed (Severe hypoglycemia)., Disp: 0.2 mL, Rfl: 2    ibuprofen (ADVIL,MOTRIN) 800 MG tablet, Take 1 tablet by mouth Every 8 (Eight) Hours As Needed for Mild Pain., Disp: 90 tablet, Rfl: 2    Insulin Dispos  Accessories (Omnipod Pod Pals) misc, Use 1 each Every 30 (Thirty) Days., Disp: 1 each, Rfl: 11    Insulin Disposable Pump (Omnipod 5 [id number] Pods Gen 5) misc, Use 1 each Every Other Day., Disp: 15 each, Rfl: 5    Insulin Lispro (HumaLOG KwikPen) 200 UNIT/ML solution pen-injector, Inject 150 Units under the skin into the appropriate area as directed Daily for 180 days. Maximum daily dose of 200 units per day., Disp: 67 mL, Rfl: 1    lisinopril (PRINIVIL,ZESTRIL) 40 MG tablet, Take 1 tablet by mouth Daily., Disp: 90 tablet, Rfl: 3    metoprolol succinate XL (TOPROL-XL) 50 MG 24 hr tablet, Take 1 tablet by mouth 2 (Two) Times a Day., Disp: 60 tablet, Rfl: 5    polyethylene glycol (MIRALAX) 17 g packet, Take as directed.  Instructions given in office.  Dispense: 238 g bottle, Disp: 238 packet, Rfl: 0    Testosterone 1.62 % gel, APPLY ONE PUMPS WORTH OF GEL APPLIED TO AXILLA EVERY DAY, Disp: , Rfl:     dapagliflozin Propanediol (Farxiga) 10 MG tablet, Take 10 mg by mouth Daily for 180 days., Disp: 90 tablet, Rfl: 1    Objective     Vitals:    12/06/24 1508   BP: 156/80   Pulse: 98   SpO2: 98%   Weight: 124 kg (274 lb)   Height: 182.9 cm (72\")     Body mass index is 37.16 kg/m².    Physical Exam  Constitutional:       Appearance: Normal appearance. He is obese.      Comments: Severe Obesity with Comorbidity ( BMI 35 - 39.9) Pt Current BMI = 37.16 with comorbidities of hypertension, hyperlipidemia, CHF and diabetes   HENT:      Head: Normocephalic and atraumatic.      Right Ear: External ear normal.      Left Ear: External ear normal.      Nose: Nose normal.   Eyes:      Extraocular Movements: " "Extraocular movements intact.      Conjunctiva/sclera: Conjunctivae normal.   Pulmonary:      Effort: Pulmonary effort is normal.   Musculoskeletal:         General: Normal range of motion.      Cervical back: Normal range of motion.   Skin:     General: Skin is warm and dry.   Neurological:      General: No focal deficit present.      Mental Status: He is alert and oriented to person, place, and time. Mental status is at baseline.   Psychiatric:         Mood and Affect: Mood normal.         Behavior: Behavior normal.         Thought Content: Thought content normal.         Judgment: Judgment normal.         {DIABETIC FOOT EXAM FOR  (Optional):36024::\"Diabetic Foot Exam Performed\":0}    Result Review :{Labs  Result Review  Imaging  Med Tab  Media :23}   The following data was reviewed by: ANJU Humphreys on 12/06/2024:    Most Recent A1C          12/6/2024    15:23   HGBA1C Most Recent   Hemoglobin A1C 10.2        A1C Last 3 Results          2/22/2024    14:14 5/21/2024    07:58 12/6/2024    15:23   HGBA1C Last 3 Results   Hemoglobin A1C 8  8.1  10.2      A1c collected in the office today is 10.2%, indicating Uncontrolled Type II diabetes.  This result is up from the prior result of 8.1% collected on 5/21/24     Glucose   Date Value Ref Range Status   12/06/2024 383 (H) 70 - 99 mg/dL Final     Comment:     Serial Number: [id number]:  [id number]     Point of care glucose in the office today is  elevated at 383 mg/dL       {CC Problem List  Visit Diagnosis  ROS  Review (Popup)  Health Maintenance  Quality  BestPractice  Medications  SmartSets  SnapShot Encounters  Media :23}       Diagnoses and all orders for this visit:    1. 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  Dispense: 15 each; Refill: " 5  -     Insulin Lispro (HumaLOG KwikPen) 200 UNIT/ML solution pen-injector; Inject 150 Units under the skin into the appropriate area as directed Daily for 180 days. Maximum daily dose of 200 units per day.  Dispense: 67 mL; Refill: 1  -     dapagliflozin Propanediol (Farxiga) 10 MG tablet; Take 10 mg by mouth Daily for 180 days.  Dispense: 90 tablet; Refill: 1  -     Continuous Glucose Sensor (Dexcom G6 Sensor); Inject  under the skin into the appropriate area as directed Every 10 (Ten) Days.  Dispense: 3 each; Refill: 1  -     Continuous Glucose Transmitter (Dexcom G6 Transmitter) misc; Inject 1 each under the skin into the appropriate area as directed Daily.  Dispense: 1 each; Refill: 0    2. 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 Insulin pump in place    4. Insulin pump titration    5. Uses self-applied continuous glucose monitoring device    6. Severe obesity (BMI 35.0-39.9) with comorbidity        Assessment & Plan  1. Diabetes Mellitus.  His A1c level is currently at 10.2, indicating a need for intervention. The potential benefits and risks of Farxiga were discussed, including its positive effects on heart, kidney, and diabetes health, as well as its potential to alleviate fluid retention in his feet. However, the risk of dehydration and genital yeast infection was also highlighted. His current basal rate is 1.85 units/h. A prescription for OmniPod pods with a change every other day was provided. He was advised to use a large-scale pharmacy for his Humalog U-100 insulin. A prescription for Farxiga 10 mg once daily was also given. He was cautioned against the use of Motrin and ibuprofen due to their potential to cause fluid retention, and was advised to use Tylenol for pain relief instead. His Dexcom settings were adjusted, increasing the overall basal rate from 1.85 units/h to 2 units/h, changing the 12 AM to 4 PM carbohydrate ratio from 8 to 6, and adjusting the 4  PM to 12 AM basal rate from 6 to 4. He was instructed to report any sudden drops in blood sugar levels.    2. Congestive Heart Failure.  He was advised to avoid the use of Motrin and ibuprofen due to their potential to cause fluid retention, and to use Tylenol for pain relief instead. The addition of Farxiga 10 mg once daily was discussed as beneficial for heart failure, and he was instructed to stay well hydrated to avoid dehydration.    3. Medication Management.  Refills for OmniPod pods and Dexcom sensors and transmitters were provided. The prescription for Humalog U-100 insulin was sent to [facility] in [place]. The prescription for Farxiga 10 mg once daily was sent to [facility]. Meloxicam was discontinued as it was not effective for his back pain.    4. Health Maintenance.  He is scheduled for a sleep apnea test on the 20th of this month. He was advised to request a copy of his eye exam report to be sent to the office.    Follow-up  Return in 6 weeks for follow up.        The patient will monitor his blood glucose levels using his CGM.  If he develops problematic hyperglycemia or hypoglycemia or adverse drug reactions, he will contact the office for further instructions.    {Time Spent (Optional):11163}    Follow Up {Instructions Charge Capture  Follow-up Communications :23}    Return in about 6 weeks (around 1/17/2025).    Patient was given instructions and counseling regarding his condition or for health maintenance advice. Please see specific information pulled into the AVS if appropriate.     Jenna Marie, APRN  12/06/2024        Dictated Utilizing Dragon Dictation.  Please note that portions of this note were completed with a voice recognition program.  Part of this note may be an electronic transcription/translation of spoken language to printed text using the Dragon Dictation System.

## 2024-12-09 ENCOUNTER — PRIOR AUTHORIZATION (OUTPATIENT)
Dept: DIABETES SERVICES | Facility: HOSPITAL | Age: 49
End: 2024-12-09
Payer: COMMERCIAL

## 2024-12-12 ENCOUNTER — LAB (OUTPATIENT)
Dept: LAB | Facility: HOSPITAL | Age: 49
End: 2024-12-12
Payer: COMMERCIAL

## 2024-12-12 ENCOUNTER — OFFICE VISIT (OUTPATIENT)
Dept: FAMILY MEDICINE CLINIC | Facility: CLINIC | Age: 49
End: 2024-12-12
Payer: COMMERCIAL

## 2024-12-12 ENCOUNTER — TRANSCRIBE ORDERS (OUTPATIENT)
Dept: LAB | Facility: HOSPITAL | Age: 49
End: 2024-12-12
Payer: COMMERCIAL

## 2024-12-12 VITALS
DIASTOLIC BLOOD PRESSURE: 83 MMHG | HEIGHT: 72 IN | OXYGEN SATURATION: 98 % | WEIGHT: 280 LBS | SYSTOLIC BLOOD PRESSURE: 170 MMHG | TEMPERATURE: 98.7 F | BODY MASS INDEX: 37.93 KG/M2 | HEART RATE: 95 BPM

## 2024-12-12 DIAGNOSIS — S30.861A TICK BITE OF ABDOMINAL WALL, INITIAL ENCOUNTER: ICD-10-CM

## 2024-12-12 DIAGNOSIS — Z79.4 TYPE 2 DIABETES MELLITUS WITH HYPERGLYCEMIA, WITH LONG-TERM CURRENT USE OF INSULIN: Chronic | ICD-10-CM

## 2024-12-12 DIAGNOSIS — I50.32 CHRONIC DIASTOLIC HEART FAILURE: ICD-10-CM

## 2024-12-12 DIAGNOSIS — I10 ESSENTIAL HYPERTENSION: Chronic | ICD-10-CM

## 2024-12-12 DIAGNOSIS — I50.32 CHRONIC DIASTOLIC HEART FAILURE: Primary | ICD-10-CM

## 2024-12-12 DIAGNOSIS — E11.65 TYPE 2 DIABETES MELLITUS WITH HYPERGLYCEMIA, WITH LONG-TERM CURRENT USE OF INSULIN: Chronic | ICD-10-CM
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
## 2024-12-12 DIAGNOSIS — J02.9 SORE THROAT: ICD-10-CM

## 2024-12-12 DIAGNOSIS — J06.9 UPPER RESPIRATORY TRACT INFECTION, UNSPECIFIED TYPE: Primary | ICD-10-CM

## 2024-12-12 DIAGNOSIS — R09.81 NASAL CONGESTION: ICD-10-CM

## 2024-12-12 LAB
ANION GAP SERPL CALCULATED.3IONS-SCNC: 14.7 MMOL/L (ref 5–15)
BUN SERPL-MCNC: 24 MG/DL (ref 6–20)
BUN/CREAT SERPL: 20.5 (ref 7–25)
CALCIUM SPEC-SCNC: 9.9 MG/DL (ref 8.6–10.5)
CHLORIDE SERPL-SCNC: 94 MMOL/L (ref 98–107)
CO2 SERPL-SCNC: 23.3 MMOL/L (ref 22–29)
CREAT SERPL-MCNC: 1.17 MG/DL (ref 0.76–1.27)
EGFRCR SERPLBLD CKD-EPI 2021: 76.4 ML/MIN/1.73
EXPIRATION DATE: NORMAL
EXPIRATION DATE: NORMAL
FLUAV AG UPPER RESP QL IA.RAPID: NOT DETECTED
FLUBV AG UPPER RESP QL IA.RAPID: NOT DETECTED
GLUCOSE SERPL-MCNC: 523 MG/DL (ref 65–99)
INTERNAL CONTROL: NORMAL
INTERNAL CONTROL: NORMAL
Lab: NORMAL
Lab: NORMAL
NT-PROBNP SERPL-MCNC: <36 PG/ML (ref 0–450)
POTASSIUM SERPL-SCNC: 4.7 MMOL/L (ref 3.5–5.2)
S PYO AG THROAT QL: NEGATIVE
SARS-COV-2 AG UPPER RESP QL IA.RAPID: NOT DETECTED
SODIUM SERPL-SCNC: 132 MMOL/L (ref 136–145)

## 2024-12-12 PROCEDURE — 86618 LYME DISEASE ANTIBODY: CPT

## 2024-12-12 PROCEDURE — 83880 ASSAY OF NATRIURETIC PEPTIDE: CPT

## 2024-12-12 PROCEDURE — 87428 SARSCOV & INF VIR A&B AG IA: CPT

## 2024-12-12 PROCEDURE — 99214 OFFICE O/P EST MOD 30 MIN: CPT

## 2024-12-12 PROCEDURE — 87880 STREP A ASSAY W/OPTIC: CPT

## 2024-12-12 PROCEDURE — 36415 COLL VENOUS BLD VENIPUNCTURE: CPT

## 2024-12-12 PROCEDURE — 80048 BASIC METABOLIC PNL TOTAL CA: CPT

## 2024-12-12 RX ORDER — TESTOSTERONE 20.25 MG/1.25G
GEL TOPICAL
Status: CANCELLED | OUTPATIENT
Start: 2024-12-12

## 2024-12-12 RX ORDER — PREDNISONE 20 MG/1
20 TABLET ORAL DAILY
Qty: 2 TABLET | Refills: 0 | Status: SHIPPED | OUTPATIENT
Start: 2024-12-12

## 2024-12-12 RX ORDER — BROMPHENIRAMINE MALEATE, PSEUDOEPHEDRINE HYDROCHLORIDE, AND DEXTROMETHORPHAN HYDROBROMIDE 2; 30; 10 MG/5ML; MG/5ML; MG/5ML
5 SYRUP ORAL 4 TIMES DAILY PRN
Qty: 118 ML | Refills: 0 | Status: SHIPPED | OUTPATIENT
Start: 2024-12-12

## 2024-12-12 RX ORDER — AZITHROMYCIN 250 MG/1
TABLET, FILM COATED ORAL
Qty: 6 TABLET | Refills: 0 | Status: SHIPPED | OUTPATIENT
Start: 2024-12-12

## 2024-12-12 NOTE — PROGRESS NOTES
Chief Complaint     Nasal Congestion (For 2days ), Sore Throat, Cough, and Shortness of Breath    History of Present Illness     Hong Fraser is a 49 y.o. male who presents to [facility] for evaluation of nasal congestion, sore throat, cough, and shortness of breath.      He was tested for strep, covid, and flu. He was negative for all three. He also has a spot around his belly button he is concerned about. It was red and had a ring around it and looked like a bite collins a couple weeks ago. The ring is gone. He is not sure what bit him but thinks it might have been a tick.      History      Past Medical History:   Diagnosis Date    Congestive heart failure 10/14/2024    [facility] Cardiologist     Diabetes mellitus     History of asthma     History of depression     Hyperlipidemia     Hypertension     Tachycardia        Past Surgical History:   Procedure Laterality Date    EYE SURGERY      Lazer to fix bleeding    HAND SURGERY Left     x2; traumatic injury    HEMORRHOIDECTOMY      Only lancing    HERNIA REPAIR      umbilical    LIPOMA EXCISION      right hip    WISDOM TOOTH EXTRACTION         Family History   Problem Relation Age of Onset    COPD Mother     Lung cancer Mother     Cancer Mother     Miscarriages / Stillbirths Mother     Hyperlipidemia Father     Hypertension Father     Coronary artery disease Father     COPD Father     Alcohol abuse Father     Arthritis Father     Depression Father     Hearing loss Father     Heart disease Father     Mental illness Father     Diabetes Brother         Current Medications        Current Outpatient Medications:     atorvastatin (LIPITOR) 20 MG tablet, Take 1 tablet by mouth Daily., Disp: 90 tablet, Rfl: 3    chlorthalidone (HYGROTON) 25 MG tablet, Take 1 tablet by mouth Daily., Disp: , Rfl:     Continuous Glucose Sensor (Dexcom G6 Sensor), Inject  under the skin into the appropriate area as directed Every 10 (Ten) Days.,  Disp: 3 each, Rfl: 1    Continuous Glucose Transmitter (Dexcom G6 Transmitter) misc, Inject 1 each under the skin into the appropriate area as directed Daily., Disp: 1 each, Rfl: 0    dapagliflozin Propanediol (Farxiga) 10 MG tablet, Take 10 mg by mouth Daily for 180 days., Disp: 90 tablet, Rfl: 1    furosemide (Lasix) 40 MG tablet, Take 1 tablet by mouth 2 (Two) Times a Day As Needed (leg swelling)., Disp: 60 tablet, Rfl: 0    Glucagon (Gvoke HypoPen 2-Pack) 1 MG/0.2ML solution auto-injector, Inject 1 mg under the skin into the appropriate area as directed As Needed (Severe hypoglycemia)., Disp: 0.2 mL, Rfl: 2    Insulin Dispos  Accessories (Omnipod Pod Pals) misc, Use 1 each Every 30 (Thirty) Days., Disp: 1 each, Rfl: 11    Insulin Disposable Pump (Omnipod 5 [id number] Pods Gen 5) misc, Use 1 each Every Other Day., Disp: 15 each, Rfl: 5    Insulin Lispro (HumaLOG KwikPen) 200 UNIT/ML solution pen-injector, Inject 150 Units under the skin into the appropriate area as directed Daily for 180 days. Maximum daily dose of 200 units per day., Disp: 67 mL, Rfl: 1    lisinopril (PRINIVIL,ZESTRIL) 40 MG tablet, Take 1 tablet by mouth Daily., Disp: 90 tablet, Rfl: 3    metoprolol succinate XL (TOPROL-XL) 50 MG 24 hr tablet, Take 1 tablet by mouth 2 (Two) Times a Day., Disp: 60 tablet, Rfl: 5    Testosterone 1.62 % gel, APPLY ONE PUMPS WORTH OF GEL APPLIED TO AXILLA EVERY DAY, Disp: , Rfl:     azithromycin (Zithromax Z-Stevenson) 250 MG tablet, Take 2 tablets by mouth on day 1, then 1 tablet daily on days 2-5, Disp: 6 tablet, Rfl: 0    brompheniramine-pseudoephedrine-DM 30-2-10 MG/5ML syrup, Take 5 mL by mouth 4 (Four) Times a Day As Needed for Cough or Allergies., Disp: 118 mL, Rfl: 0    predniSONE (DELTASONE) 20 MG tablet, Take 1 tablet by mouth Daily., Disp: 2 tablet, Rfl: 0     Allergies     No Known Allergies    Social History       Social History     Social History Narrative    Not on file       Immunizations  "    Immunization:  Immunization History   Administered Date(s) Administered    Fluzone (or Fluarix & Flulaval for VFC) >6mos 09/28/2021    Influenza, Unspecified 09/28/2021    Tdap 11/16/2018, 11/28/2023, 03/08/2024          Objective     Objective     Vital Signs:   /83 (BP Location: Left arm, Patient Position: Sitting, Cuff Size: Adult)   Pulse 95   Temp 98.7 °F (37.1 °C) (Temporal)   Ht 182.9 cm (72\")   Wt 127 kg (280 lb)   SpO2 98%   BMI 37.97 kg/m²       Physical Exam  Constitutional:       Appearance: Normal appearance.   HENT:      Nose: Nose normal.      Mouth/Throat:      Mouth: Mucous membranes are moist.   Cardiovascular:      Rate and Rhythm: Normal rate and regular rhythm.   Pulmonary:      Effort: Pulmonary effort is normal.      Breath sounds: Normal breath sounds.   Skin:     General: Skin is warm and dry.   Neurological:      General: No focal deficit present.      Mental Status: He is alert and oriented to person, place, and time.   Psychiatric:         Mood and Affect: Mood normal.         Behavior: Behavior normal.         Results    The following data was reviewed by: ANJU Barragan on 12/12/2024:             Assessment and Plan        Assessment and Plan    Diagnoses and all orders for this visit:    1. Upper respiratory tract infection, unspecified type (Primary)  -     brompheniramine-pseudoephedrine-DM 30-2-10 MG/5ML syrup; Take 5 mL by mouth 4 (Four) Times a Day As Needed for Cough or Allergies.  Dispense: 118 mL; Refill: 0  -     azithromycin (Zithromax Z-Stevenson) 250 MG tablet; Take 2 tablets by mouth on day 1, then 1 tablet daily on days 2-5  Dispense: 6 tablet; Refill: 0  -     predniSONE (DELTASONE) 20 MG tablet; Take 1 tablet by mouth Daily.  Dispense: 2 tablet; Refill: 0    2. Type 2 diabetes mellitus with hyperglycemia, with long-term current use of insulin  Assessment & Plan:  Diabetes is improving with treatment.   Continue current treatment regimen.  Diabetes will " be reassessed in 6 months      3. Sore throat  -     POCT rapid strep A  -     POCT SARS-CoV-2 Antigen SPARKLE + Flu    4. Nasal congestion  -     POCT SARS-CoV-2 Antigen SPARKLE + Flu    5. Essential hypertension  Assessment & Plan:  BP is slightly elevated today. He is sick, will re-evaluate when he is healthy.       6. Tick bite of abdominal wall, initial encounter  -     Lyme Disease Total Antibody With Reflex to Immunoassay; Future              Follow Up        Follow Up   No follow-ups on file.  Patient was given instructions and counseling regarding his condition or for health maintenance advice. Please see specific information pulled into the AVS if appropriate.

## 2024-12-13 ENCOUNTER — PATIENT ROUNDING (BHMG ONLY) (OUTPATIENT)
Dept: FAMILY MEDICINE CLINIC | Facility: CLINIC | Age: 49
End: 2024-12-13
Payer: COMMERCIAL

## 2024-12-13 LAB — B BURGDOR IGG+IGM SER QL IA: NEGATIVE

## 2025-01-02 ENCOUNTER — PRIOR AUTHORIZATION (OUTPATIENT)
Dept: DIABETES SERVICES | Facility: HOSPITAL | Age: 50
End: 2025-01-02
Payer: COMMERCIAL

## 2025-01-22 NOTE — PROGRESS NOTES
Chief Complaint  Diabetes (Med management, A1C, CGM Eval, wanting to check pump settings, Farxiga is too expensive, wanting to discuss going back on Ozempic or something similar)    Referred By: Krystyna Collins,*    Subjective     {Problem List  Visit Diagnosis   Encounters  Notes  Medications  Labs  Result Review Imaging  Media :23}     Patient or patient representative verbalized consent for the use of Ambient Listening during the visit with  ANJU Humphreys for chart documentation. 1/23/2025  09:30 EST    Hong Fraser presents to [facility] for insulin pump management    History of Present Illness    Visit type:  follow-up  Diabetes type:  Type 2  Current diabetes status/concerns/issues:     History of Present Illness        Current Diabetes symptoms:    Polyuria: No   Polydipsia: No   Polyphagia: No   Blurred vision: No   Excessive fatigue: No  Known Diabetes complications:  Neuropathy: Numbness and Shooting Pain; Location: Feet  Renal: No current urine microalbumin available and Stage II mild (GFR = 60-89 mL/min)  Eyes: No current eye exam available in record and Other: 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  Amputation/Wounds: None  GI: None  Cardiovascular: Hypertension and Hyperlipidemia and CHF  ED: Patient Reported; he uses injection therapy  Other: None  Hypoglycemia:  None reported at this time  Hypoglycemia Symptoms:  No hypoglycemia at this time  Current diabetes treatment:   Omnipod insulin pump using Humalog U200 insulin    Blood glucose device:  Dexcom CGM  Blood glucose monitoring frequency:  Continuous per CGM  Blood glucose range/average:  The 14-day sensor report shows an average glucose of 237 mg/dL, with 22% in target range ( mgdL), 37% in the high range (181-250 mg/dL), 41% in the very high range (>250 mg/dL), 0% in the low range (54-70 mg/dL) and 0% in the very low  range (<54 mg/dL).   Glucose Source: Device Reviewed  Diet:  Carbohydrate Counting, Limits high carb/sweet foods, Avoids sugary drinks  Activity/Exercise:  None    Past Medical History:   Diagnosis Date    Congestive heart failure 10/14/2024    [facility] Cardiologist     Diabetes mellitus     History of asthma     History of depression     Hyperlipidemia     Hypertension     Tachycardia      Past Surgical History:   Procedure Laterality Date    EYE SURGERY      Lazer to fix bleeding    HAND SURGERY Left     x2; traumatic injury    HEMORRHOIDECTOMY      Only lancing    HERNIA REPAIR      umbilical    LIPOMA EXCISION      right hip    WISDOM TOOTH EXTRACTION       Family History   Problem Relation Age of Onset    COPD Mother     Lung cancer Mother     Cancer Mother     Miscarriages / Stillbirths Mother     Hyperlipidemia Father     Hypertension Father     Coronary artery disease Father     COPD Father     Alcohol abuse Father     Arthritis Father     Depression Father     Hearing loss Father     Heart disease Father     Mental illness Father     Diabetes Brother      Social History     Socioeconomic History    Marital status:     Number of children: 2   Tobacco Use    Smoking status: Former     Current packs/day: 0.00     Average packs/day: 1 pack/day for 20.0 years (20.0 ttl pk-yrs)     Types: Cigarettes     Start date: 1990     Quit date: 2010     Years since quitting: 15.0     Passive exposure: Never    Smokeless tobacco: Former    Tobacco comments:     Off and on   Vaping Use    Vaping status: Never Used   Substance and Sexual Activity    Alcohol use: Yes     Alcohol/week: 1.0 standard drink of alcohol     Types: 1 Cans of beer per week     Comment: Social    Drug use: Never    Sexual activity: Yes     Partners: Female     No Known Allergies    Current Outpatient Medications:     atorvastatin (LIPITOR) 20 MG tablet, Take 1 tablet by mouth Daily., Disp: 90 tablet, Rfl: 3    chlorthalidone (HYGROTON)  25 MG tablet, Take 1 tablet by mouth Daily., Disp: , Rfl:     Continuous Glucose Sensor (Dexcom G6 Sensor), Inject  under the skin into the appropriate area as directed Every 10 (Ten) Days., Disp: 3 each, Rfl: 1    Continuous Glucose Transmitter (Dexcom G6 Transmitter) misc, Inject 1 each under the skin into the appropriate area as directed Daily., Disp: 1 each, Rfl: 0    furosemide (Lasix) 40 MG tablet, Take 1 tablet by mouth 2 (Two) Times a Day As Needed (leg swelling)., Disp: 60 tablet, Rfl: 0    Glucagon (Gvoke HypoPen 2-Pack) 1 MG/0.2ML solution auto-injector, Inject 1 mg under the skin into the appropriate area as directed As Needed (Severe hypoglycemia)., Disp: 0.2 mL, Rfl: 2    Insulin Dispos  Accessories (Omnipod Pod Pals) misc, Use 1 each Every 30 (Thirty) Days., Disp: 1 each, Rfl: 11    Insulin Disposable Pump (Omnipod 5 [id number] Pods Gen 5) misc, Use 1 each Every Other Day., Disp: 15 each, Rfl: 5    Insulin Lispro (HumaLOG KwikPen) 200 UNIT/ML solution pen-injector, Inject 150 Units under the skin into the appropriate area as directed Daily for 180 days. Maximum daily dose of 200 units per day., Disp: 67 mL, Rfl: 1    lisinopril (PRINIVIL,ZESTRIL) 40 MG tablet, Take 1 tablet by mouth Daily., Disp: 90 tablet, Rfl: 3    metoprolol succinate XL (TOPROL-XL) 50 MG 24 hr tablet, Take 1 tablet by mouth 2 (Two) Times a Day., Disp: 60 tablet, Rfl: 5    Testosterone 1.62 % gel, APPLY ONE PUMPS WORTH OF GEL APPLIED TO AXILLA EVERY DAY, Disp: , Rfl:     azithromycin (Zithromax Z-Stevenson) 250 MG tablet, Take 2 tablets by mouth on day 1, then 1 tablet daily on days 2-5, Disp: 6 tablet, Rfl: 0    brompheniramine-pseudoephedrine-DM 30-2-10 MG/5ML syrup, Take 5 mL by mouth 4 (Four) Times a Day As Needed for Cough or Allergies. (Patient not taking: Reported on 1/23/2025), Disp: 118 mL, Rfl: 0    dapagliflozin Propanediol (Farxiga) 10 MG tablet, Take 10 mg by mouth Daily for 180 days. (Patient not taking: Reported on  "1/23/2025), Disp: 90 tablet, Rfl: 1    predniSONE (DELTASONE) 20 MG tablet, Take 1 tablet by mouth Daily. (Patient not taking: Reported on 1/23/2025), Disp: 2 tablet, Rfl: 0    Objective     Vitals:    01/23/25 0854   BP: 177/91   BP Location: Left arm   Patient Position: Sitting   Cuff Size: Adult   Temp: 98 °F (36.7 °C)   TempSrc: Temporal   SpO2: 98%   Weight: 126 kg (277 lb 8 oz)   Height: 182.9 cm (72\")     Body mass index is 37.64 kg/m².    Physical Exam  Constitutional:       Appearance: Normal appearance. He is obese.      Comments: Severe Obesity with Comorbidity ( BMI 35 - 39.9) Pt Current BMI = 37.64 with comorbidities of hypertension, hyperlipidemia, CHF, and diabetes   HENT:      Head: Normocephalic and atraumatic.      Right Ear: External ear normal.      Left Ear: External ear normal.      Nose: Nose normal.   Eyes:      Extraocular Movements: Extraocular movements intact.      Conjunctiva/sclera: Conjunctivae normal.   Pulmonary:      Effort: Pulmonary effort is normal.   Musculoskeletal:         General: Normal range of motion.      Cervical back: Normal range of motion.   Skin:     General: Skin is warm and dry.   Neurological:      General: No focal deficit present.      Mental Status: He is alert and oriented to person, place, and time. Mental status is at baseline.   Psychiatric:         Mood and Affect: Mood normal.         Behavior: Behavior normal.         Thought Content: Thought content normal.         Judgment: Judgment normal.         {DIABETIC FOOT EXAM FOR  (Optional):66428::\"Diabetic Foot Exam Performed\":0}    Result Review :{Labs  Result Review  Imaging  Med Tab  Media :23}   The following data was reviewed by: ANJU Humphreys on 01/21/2025:    Most Recent A1C          1/23/2025    09:00   HGBA1C Most Recent   Hemoglobin A1C 10.1        A1C Last 3 Results          5/21/2024    07:58 12/6/2024    15:23 1/23/2025    09:00   HGBA1C Last 3 Results   Hemoglobin A1C 8.1  " 10.2  10.1      A1c collected in the office today is 10.1%, indicating Uncontrolled Type II diabetes.  This result is down from the prior result of 10.2% collected on 12/6/24     Glucose   Date Value Ref Range Status   01/23/2025 159 (H) 70 - 99 mg/dL Final     Comment:     Serial Number: [id number]:  [id number]     Point of care glucose in the office today is within normal limits for nonfasting glucose    Creatinine   Date Value Ref Range Status   12/12/2024 1.17 0.76 - 1.27 mg/dL Final   11/20/2023 1.07 0.76 - 1.27 mg/dL Final     eGFR   Date Value Ref Range Status   12/12/2024 76.4 >60.0 mL/min/1.73 Final   11/20/2023 85.6 >60.0 mL/min/1.73 Final     Labs collected on 12/12/24 show Stage II mild (GFR = 60-89mL/min)         {CC Problem List  Visit Diagnosis  ROS  Review (Popup)  Health Maintenance  Quality  BestPractice  Medications  SmartSets  SnapShot Encounters  Media :23}       Diagnoses and all orders for this visit:    1. 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 Latent autoimmune diabetes mellitus in adult (CHARLENE) with diabetic neuropathy    4. Insulin pump in place    5. Uses self-applied continuous glucose monitoring device    6. Severe obesity (BMI 35.0-39.9) with comorbidity        Assessment & Plan          The patient will monitor his blood glucose levels per continuous glucose monitor.  If he develops problematic hyperglycemia or hypoglycemia or adverse drug reactions, he will contact the office for further instructions.    {Time Spent (Optional):24536}    Follow Up {Instructions Charge Capture  Follow-up Communications :23}    No follow-ups on file.    Patient was given instructions and counseling regarding his condition or for health maintenance advice. Please see specific information pulled into  the AVS if appropriate.     Jenna Marie, APRN  01/23/2025        Dictated Utilizing Dragon Dictation.  Please note that portions of this note were completed with a voice recognition program.  Part of this note may be an electronic transcription/translation of spoken language to printed text using the Dragon Dictation System.   continue using Humalog U200 insulin in his pump. He requested a new meter with test strips and lancets, which will be sent to his pharmacy.    2. Medication Management.  He mentioned that Farxiga is too expensive due to his current employment status and insurance issues. A discount card for Farxiga will be provided to help with the cost.         The patient will monitor his blood glucose levels per continuous glucose monitor.  If he develops problematic hyperglycemia or hypoglycemia or adverse drug reactions, he will contact the office for further instructions.        Follow Up     Return in about 2 months (around 3/23/2025) for Pump Eval, CGM Follow-up.    Patient was given instructions and counseling regarding his condition or for health maintenance advice. Please see specific information pulled into the AVS if appropriate.     Jenna Marie, APRN  01/23/2025        Dictated Utilizing Dragon Dictation.  Please note that portions of this note were completed with a voice recognition program.  Part of this note may be an electronic transcription/translation of spoken language to printed text using the Dragon Dictation System.

## 2025-01-23 ENCOUNTER — OFFICE VISIT (OUTPATIENT)
Dept: DIABETES SERVICES | Facility: HOSPITAL | Age: 50
End: 2025-01-23
Payer: COMMERCIAL

## 2025-01-23 VITALS
BODY MASS INDEX: 37.59 KG/M2 | DIASTOLIC BLOOD PRESSURE: 91 MMHG | HEIGHT: 72 IN | SYSTOLIC BLOOD PRESSURE: 177 MMHG | TEMPERATURE: 98 F | WEIGHT: 277.5 LBS | OXYGEN SATURATION: 98 %

## 2025-01-23 DIAGNOSIS — E13.40: ICD-10-CM

## 2025-01-23 DIAGNOSIS — E13.22 LATENT AUTOIMMUNE DIABETES MELLITUS IN ADULT (LADA) WITH CHRONIC KIDNEY DISEASE: ICD-10-CM

## 2025-01-23 DIAGNOSIS — E66.01 SEVERE OBESITY (BMI 35.0-39.9) WITH COMORBIDITY: ICD-10-CM

## 2025-01-23 DIAGNOSIS — E13.9 LADA (LATENT AUTOIMMUNE DIABETES IN ADULTS), MANAGED AS TYPE 2: Primary | ICD-10-CM

## 2025-01-23 DIAGNOSIS — Z96.41 INSULIN PUMP IN PLACE: ICD-10-CM

## 2025-01-23 DIAGNOSIS — Z97.8 USES SELF-APPLIED CONTINUOUS GLUCOSE MONITORING DEVICE: ICD-10-CM

## 2025-01-23 DIAGNOSIS — E13.319 LATENT AUTOIMMUNE DIABETES MELLITUS IN ADULT (LADA) WITH DIABETIC RETINOPATHY: ICD-10-CM

## 2025-01-23 LAB
EXPIRATION DATE: ABNORMAL
GLUCOSE BLDC GLUCOMTR-MCNC: 159 MG/DL (ref 70–99)
HBA1C MFR BLD: 10.1 % (ref 4.5–5.7)
Lab: ABNORMAL

## 2025-01-23 PROCEDURE — 83036 HEMOGLOBIN GLYCOSYLATED A1C: CPT | Performed by: NURSE PRACTITIONER

## 2025-01-23 PROCEDURE — G0463 HOSPITAL OUTPT CLINIC VISIT: HCPCS | Performed by: NURSE PRACTITIONER

## 2025-01-23 PROCEDURE — 82948 REAGENT STRIP/BLOOD GLUCOSE: CPT | Performed by: NURSE PRACTITIONER

## 2025-01-23 PROCEDURE — 99214 OFFICE O/P EST MOD 30 MIN: CPT | Performed by: NURSE PRACTITIONER

## 2025-01-23 PROCEDURE — 95251 CONT GLUC MNTR ANALYSIS I&R: CPT | Performed by: NURSE PRACTITIONER

## 2025-01-23 RX ORDER — LANCETS 30 GAUGE
EACH MISCELLANEOUS
Qty: 50 EACH | Refills: 5 | Status: SHIPPED | OUTPATIENT
Start: 2025-01-23

## 2025-03-18 DIAGNOSIS — E13.9 LADA (LATENT AUTOIMMUNE DIABETES IN ADULTS), MANAGED AS TYPE 2: ICD-10-CM

## 2025-03-19 RX ORDER — PROCHLORPERAZINE 25 MG/1
SUPPOSITORY RECTAL
Qty: 3 EACH | Refills: 0 | Status: SHIPPED | OUTPATIENT
Start: 2025-03-19

## 2025-03-27 ENCOUNTER — OFFICE VISIT (OUTPATIENT)
Dept: DIABETES SERVICES | Facility: HOSPITAL | Age: 50
End: 2025-03-27
Payer: COMMERCIAL

## 2025-03-27 VITALS
DIASTOLIC BLOOD PRESSURE: 84 MMHG | BODY MASS INDEX: 38.74 KG/M2 | OXYGEN SATURATION: 96 % | SYSTOLIC BLOOD PRESSURE: 156 MMHG | HEIGHT: 72 IN | HEART RATE: 87 BPM | WEIGHT: 286 LBS

## 2025-03-27 DIAGNOSIS — Z46.81 INSULIN PUMP TITRATION: ICD-10-CM

## 2025-03-27 DIAGNOSIS — E13.9 LADA (LATENT AUTOIMMUNE DIABETES IN ADULTS), MANAGED AS TYPE 2: Primary | ICD-10-CM

## 2025-03-27 DIAGNOSIS — Z96.41 INSULIN PUMP IN PLACE: ICD-10-CM

## 2025-03-27 DIAGNOSIS — E66.01 SEVERE OBESITY (BMI 35.0-39.9) WITH COMORBIDITY: ICD-10-CM

## 2025-03-27 DIAGNOSIS — E13.22 LATENT AUTOIMMUNE DIABETES MELLITUS IN ADULT (LADA) WITH CHRONIC KIDNEY DISEASE: ICD-10-CM

## 2025-03-27 DIAGNOSIS — Z97.8 USES SELF-APPLIED CONTINUOUS GLUCOSE MONITORING DEVICE: ICD-10-CM

## 2025-03-27 DIAGNOSIS — E13.40: ICD-10-CM

## 2025-03-27 DIAGNOSIS — E13.319 LATENT AUTOIMMUNE DIABETES MELLITUS IN ADULT (LADA) WITH DIABETIC RETINOPATHY: ICD-10-CM

## 2025-03-27 LAB
ALBUMIN UR-MCNC: 47.9 MG/DL
CREAT UR-MCNC: 50.2 MG/DL
EXPIRATION DATE: ABNORMAL
GLUCOSE BLDC GLUCOMTR-MCNC: 203 MG/DL (ref 70–99)
HBA1C MFR BLD: 8.2 % (ref 4.5–5.7)
Lab: ABNORMAL
MICROALBUMIN/CREAT UR: 954.2 MG/G (ref 0–29)

## 2025-03-27 PROCEDURE — 82570 ASSAY OF URINE CREATININE: CPT | Performed by: NURSE PRACTITIONER

## 2025-03-27 PROCEDURE — 83036 HEMOGLOBIN GLYCOSYLATED A1C: CPT | Performed by: NURSE PRACTITIONER

## 2025-03-27 PROCEDURE — 82043 UR ALBUMIN QUANTITATIVE: CPT | Performed by: NURSE PRACTITIONER

## 2025-03-27 PROCEDURE — 82948 REAGENT STRIP/BLOOD GLUCOSE: CPT | Performed by: NURSE PRACTITIONER

## 2025-03-27 PROCEDURE — G0463 HOSPITAL OUTPT CLINIC VISIT: HCPCS | Performed by: NURSE PRACTITIONER
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
## 2025-04-18 ENCOUNTER — TELEPHONE (OUTPATIENT)
Dept: DIABETES SERVICES | Facility: HOSPITAL | Age: 50
End: 2025-04-18
Payer: COMMERCIAL

## 2025-04-18 DIAGNOSIS — E13.9 LADA (LATENT AUTOIMMUNE DIABETES IN ADULTS), MANAGED AS TYPE 2: ICD-10-CM

## 2025-04-18 RX ORDER — PROCHLORPERAZINE 25 MG/1
SUPPOSITORY RECTAL
Qty: 3 EACH | Refills: 1 | OUTPATIENT
Start: 2025-04-18

## 2025-04-18 RX ORDER — PROCHLORPERAZINE 25 MG/1
SUPPOSITORY RECTAL
Qty: 3 EACH | Refills: 5 | Status: SHIPPED | OUTPATIENT
Start: 2025-04-18

## 2025-04-18 RX ORDER — PROCHLORPERAZINE 25 MG/1
1 SUPPOSITORY RECTAL
Qty: 1 EACH | Refills: 1 | Status: SHIPPED | OUTPATIENT
Start: 2025-04-18

## 2025-04-18 NOTE — TELEPHONE ENCOUNTER
Patient called the after hours answering service stating that he needed a prior authorization for his DEXCOM G6 transmitter. Patient states he spoke with office personnel earlier and was informed that the prescription had been sent to his pharmacy and when he went to the pharmacy to retrieve his prescription he was informed that it could not be filled until a PA was completed. A stat PA was initiated at 5:30 PM and was approved by [employer] at 5:50 PM. Patient notified of PA approva via [employer] message as his voicemail box would not accept a message.

## 2025-04-23 ENCOUNTER — OFFICE VISIT (OUTPATIENT)
Dept: FAMILY MEDICINE CLINIC | Facility: CLINIC | Age: 50
End: 2025-04-23
Payer: COMMERCIAL

## 2025-04-23 VITALS
SYSTOLIC BLOOD PRESSURE: 140 MMHG | HEART RATE: 92 BPM | TEMPERATURE: 99.5 F | HEIGHT: 72 IN | OXYGEN SATURATION: 100 % | BODY MASS INDEX: 38.74 KG/M2 | DIASTOLIC BLOOD PRESSURE: 70 MMHG | WEIGHT: 286 LBS

## 2025-04-23 DIAGNOSIS — Z12.11 ENCOUNTER FOR SCREENING FOR MALIGNANT NEOPLASM OF COLON: ICD-10-CM

## 2025-04-23 DIAGNOSIS — J06.9 VIRAL URI WITH COUGH: ICD-10-CM

## 2025-04-23 DIAGNOSIS — R52 BODY ACHES: Primary | ICD-10-CM

## 2025-04-23 DIAGNOSIS — R53.83 FATIGUE, UNSPECIFIED TYPE: ICD-10-CM

## 2025-04-23 LAB
EXPIRATION DATE: NORMAL
FLUAV AG UPPER RESP QL IA.RAPID: NOT DETECTED
FLUBV AG UPPER RESP QL IA.RAPID: NOT DETECTED
INTERNAL CONTROL: NORMAL
Lab: NORMAL
SARS-COV-2 AG UPPER RESP QL IA.RAPID: NOT DETECTED

## 2025-04-23 RX ORDER — CYANOCOBALAMIN 1000 UG/ML
1000 INJECTION, SOLUTION INTRAMUSCULAR; SUBCUTANEOUS
Status: SHIPPED | OUTPATIENT
Start: 2025-04-23

## 2025-04-23 RX ORDER — METHYLPREDNISOLONE 4 MG/1
TABLET ORAL
Qty: 1 EACH | Refills: 0 | Status: SHIPPED | OUTPATIENT
Start: 2025-04-23

## 2025-04-23 RX ORDER — AZITHROMYCIN 250 MG/1
TABLET, FILM COATED ORAL
Qty: 6 TABLET | Refills: 0 | Status: SHIPPED | OUTPATIENT
Start: 2025-04-23

## 2025-04-23 RX ADMIN — CYANOCOBALAMIN 1000 MCG: 1000 INJECTION, SOLUTION INTRAMUSCULAR; SUBCUTANEOUS at 15:53

## 2025-04-23 NOTE — PROGRESS NOTES
Chief Complaint     Nasal Congestion (x3days), Sore Throat, Fever, Shortness of Breath, Fatigue, Cough, and Wheezing    Patient or patient representative verbalized consent for the use of Ambient Listening during the visit with  ANJU Barragan for chart documentation. 4/25/2025  08:10 EDT    History of Present Illness     Hong Fraser is a 49 y.o. male who presents to [facility] .    History of Present Illness  The patient presents for evaluation of a viral infection.    Symptoms consistent with a viral infection are reported, initially suspected to be COVID-19 or influenza, but both tests returned negative results. The onset of symptoms included irritation in the nose, ears, and throat, with persistent discomfort in one ear. A sensation of drainage is noted when in a supine position, particularly noticeable during the previous night. A bitter taste upon coughing, reminiscent of the taste associated with an upper respiratory infection, is described. Concern about the potential contagiousness of the condition is expressed, as he frequently accompanies his daughter to [facility] for fishing activities. Additional treatment to enhance energy levels and overall well-being is sought. Self-medication with Mucinex, 800 mg of ibuprofen, and 5 mg of Xyzal, in addition to regular medications, is reported.    A history of diabetes managed with an insulin pump is noted.         History      Past Medical History:   Diagnosis Date    Arthritis     Congestive heart failure 10/14/2024    [facility] Cardiologist     Depression     Diabetes mellitus     Erectile dysfunction     History of asthma     History of depression     Hyperlipidemia     Hypertension     Low back pain     Lumbosacral disc disease     Obesity     Peripheral neuropathy     Tachycardia        Past Surgical History:   Procedure Laterality Date    EYE SURGERY      Lazer to fix bleeding    HAND SURGERY Left      x2; traumatic injury    HEMORRHOIDECTOMY      Only lancing    HERNIA REPAIR      umbilical    LIPOMA EXCISION      right hip    UMBILICAL HERNIA REPAIR      WISDOM TOOTH EXTRACTION         Family History   Problem Relation Age of Onset    COPD Mother     Lung cancer Mother     Cancer Mother     Miscarriages / Stillbirths Mother     Hyperlipidemia Father     Hypertension Father     Coronary artery disease Father     COPD Father     Alcohol abuse Father     Arthritis Father     Depression Father     Hearing loss Father     Heart disease Father     Mental illness Father     Diabetes Brother         Current Medications        Current Outpatient Medications:     atorvastatin (LIPITOR) 20 MG tablet, Take 1 tablet by mouth Daily., Disp: 90 tablet, Rfl: 3    Blood Glucose Monitoring Suppl device, Use as directed for blood glucose monitoring, Disp: 1 each, Rfl: 0    chlorthalidone (HYGROTON) 25 MG tablet, Take 1 tablet by mouth Daily., Disp: , Rfl:     Continuous Glucose Sensor (Dexcom G6 Sensor), Use Every 10 (Ten) Days., Disp: 3 each, Rfl: 5    Continuous Glucose Transmitter (Dexcom G6 Transmitter) misc, Use 1 each Every 3 (Three) Months., Disp: 1 each, Rfl: 1    dapagliflozin Propanediol (Farxiga) 10 MG tablet, Take 10 mg by mouth Daily for 180 days., Disp: 90 tablet, Rfl: 1    furosemide (Lasix) 40 MG tablet, Take 1 tablet by mouth 2 (Two) Times a Day As Needed (leg swelling)., Disp: 60 tablet, Rfl: 0    Glucagon (Gvoke HypoPen 2-Pack) 1 MG/0.2ML solution auto-injector, Inject 1 mg under the skin into the appropriate area as directed As Needed (Severe hypoglycemia)., Disp: 0.2 mL, Rfl: 2    glucose blood test strip, Test blood glucose 1-2 times each day for sensor calibrations, Disp: 50 each, Rfl: 5    Insulin Dispos  Accessories (Omnipod Pod Pals) misc, Use 1 each Every 30 (Thirty) Days., Disp: 1 each, Rfl: 11    Insulin Disposable Pump (Omnipod 5 VmbH0T6 Pods Gen 5) misc, Use 1 each Every Other Day., Disp: 15  "each, Rfl: 5    Insulin Lispro (HumaLOG KwikPen) 200 UNIT/ML solution pen-injector, Inject 150 Units under the skin into the appropriate area as directed Daily for 180 days. Maximum daily dose of 200 units per day., Disp: 67 mL, Rfl: 1    Lancets misc, Test blood glucose 1-2 times each day for sensor calibration, Disp: 50 each, Rfl: 5    lisinopril (PRINIVIL,ZESTRIL) 40 MG tablet, Take 1 tablet by mouth Daily., Disp: 90 tablet, Rfl: 3    metoprolol succinate XL (TOPROL-XL) 50 MG 24 hr tablet, Take 1 tablet by mouth 2 (Two) Times a Day., Disp: 60 tablet, Rfl: 5    Testosterone 1.62 % gel, APPLY ONE PUMPS WORTH OF GEL APPLIED TO AXILLA EVERY DAY, Disp: , Rfl:     azithromycin (Zithromax Z-Stevenson) 250 MG tablet, Take 2 tablets by mouth on day 1, then 1 tablet daily on days 2-5, Disp: 6 tablet, Rfl: 0    methylPREDNISolone (MEDROL) 4 MG dose pack, Take as directed on package instructions., Disp: 1 each, Rfl: 0    Current Facility-Administered Medications:     cyanocobalamin injection 1,000 mcg, 1,000 mcg, Intramuscular, Q28 Days, Kaylah Steinberg, ANJU, 1,000 mcg at 04/23/25 1553     Allergies     No Known Allergies    Social History       Social History     Social History Narrative    Not on file       Immunizations     Immunization:  Immunization History   Administered Date(s) Administered    Fluzone (or Fluarix & Flulaval for VFC) >6mos 09/28/2021    Influenza, Unspecified 09/28/2021    Tdap 11/16/2018, 11/28/2023, 03/08/2024          Objective     Objective     Vital Signs:   /70 (BP Location: Left arm, Patient Position: Sitting, Cuff Size: Adult)   Pulse 92   Temp 99.5 °F (37.5 °C) (Temporal)   Ht 182.9 cm (72\")   Wt 130 kg (286 lb)   SpO2 100%   BMI 38.79 kg/m²       Physical Exam  Constitutional:       Appearance: Normal appearance.   HENT:      Nose: Nose normal. Congestion present.      Mouth/Throat:      Mouth: Mucous membranes are moist.      Pharynx: Posterior oropharyngeal erythema present. "   Cardiovascular:      Rate and Rhythm: Normal rate and regular rhythm.      Pulses: Normal pulses.      Heart sounds: Normal heart sounds.   Pulmonary:      Effort: Pulmonary effort is normal.      Breath sounds: Normal breath sounds.   Skin:     General: Skin is warm and dry.   Neurological:      General: No focal deficit present.      Mental Status: He is alert and oriented to person, place, and time.   Psychiatric:         Mood and Affect: Mood normal.         Behavior: Behavior normal.         Physical Exam  Ears: External ear canals and tympanic membranes intact  Mouth/Throat: Throat irritation noted  Respiratory: Clear to auscultation, no wheezing, rales or rhonchi      Results    The following data was reviewed by: ANJU Barragan on 04/23/2025:    Common labs          12/12/2024    14:45 1/23/2025    09:00 3/27/2025    07:59 3/27/2025    08:54   Common Labs   Glucose 523       BUN 24       Creatinine 1.17       Sodium 132       Potassium 4.7       Chloride 94       Calcium 9.9       Hemoglobin A1C  10.1  8.2     Microalbumin, Urine    47.9          Results  Labs   - COVID-19 test: Negative   - Influenza test: Negative       Assessment and Plan        Assessment and Plan    Diagnoses and all orders for this visit:    1. Body aches (Primary)  -     POCT SARS-CoV-2 Antigen SPARKLE + Flu  -     azithromycin (Zithromax Z-Stevenson) 250 MG tablet; Take 2 tablets by mouth on day 1, then 1 tablet daily on days 2-5  Dispense: 6 tablet; Refill: 0    2. Encounter for screening for malignant neoplasm of colon    3. Viral URI with cough  -     methylPREDNISolone (MEDROL) 4 MG dose pack; Take as directed on package instructions.  Dispense: 1 each; Refill: 0    4. Fatigue, unspecified type  -     cyanocobalamin injection 1,000 mcg  -     azithromycin (Zithromax Z-Stevenson) 250 MG tablet; Take 2 tablets by mouth on day 1, then 1 tablet daily on days 2-5  Dispense: 6 tablet; Refill: 0        Assessment & Plan  1. Viral  infection.  - Significant throat irritation noted.  - Lungs are clear upon examination.  - Medrol Dosepak prescribed to alleviate symptoms; B12 injection to be administered during this visit.  - Continue current medication regimen (Mucinex, ibuprofen 800 mg, Xyzal 5 mg); Z-Stevenson provided for use if no improvement by Saturday; advised to contact if no improvement.    2. Diabetes mellitus.  - Managed with an insulin pump.  - Discussed potential impact of steroid use on insulin requirements.  - Advised to monitor blood glucose levels closely and adjust insulin dosage as needed while on the steroid.      I spent 30 minutes caring for Hong on this date of service. This time includes time spent by me in the following activities:preparing for the visit, reviewing tests, performing a medically appropriate examination and/or evaluation , counseling and educating the patient/family/caregiver, ordering medications, tests, or procedures, and documenting information in the medical record  Follow Up        Follow Up   No follow-ups on file.  Patient was given instructions and counseling regarding his condition or for health maintenance advice. Please see specific information pulled into the AVS if appropriate.

## 2025-04-28 ENCOUNTER — OFFICE VISIT (OUTPATIENT)
Dept: FAMILY MEDICINE CLINIC | Facility: CLINIC | Age: 50
End: 2025-04-28
Payer: COMMERCIAL

## 2025-04-28 ENCOUNTER — LAB (OUTPATIENT)
Dept: LAB | Facility: HOSPITAL | Age: 50
End: 2025-04-28
Payer: COMMERCIAL

## 2025-04-28 ENCOUNTER — HOSPITAL ENCOUNTER (OUTPATIENT)
Dept: GENERAL RADIOLOGY | Facility: HOSPITAL | Age: 50
Discharge: HOME | End: 2025-04-28
Payer: COMMERCIAL

## 2025-04-28 VITALS
SYSTOLIC BLOOD PRESSURE: 119 MMHG | DIASTOLIC BLOOD PRESSURE: 77 MMHG | TEMPERATURE: 98.7 F | BODY MASS INDEX: 38.33 KG/M2 | HEIGHT: 72 IN | HEART RATE: 92 BPM | OXYGEN SATURATION: 99 % | WEIGHT: 283 LBS

## 2025-04-28 DIAGNOSIS — R05.2 SUBACUTE COUGH: Primary | ICD-10-CM

## 2025-04-28 DIAGNOSIS — M79.641 RIGHT HAND PAIN: ICD-10-CM

## 2025-04-28 DIAGNOSIS — R06.02 SHORTNESS OF BREATH: ICD-10-CM

## 2025-04-28 DIAGNOSIS — J04.0 LARYNGITIS: ICD-10-CM

## 2025-04-28 DIAGNOSIS — R05.2 SUBACUTE COUGH: ICD-10-CM

## 2025-04-28 LAB
BASOPHILS # BLD AUTO: 0.05 10*3/MM3 (ref 0–0.2)
BASOPHILS NFR BLD AUTO: 0.5 % (ref 0–1.5)
DEPRECATED RDW RBC AUTO: 40.2 FL (ref 37–54)
EOSINOPHIL # BLD AUTO: 0.04 10*3/MM3 (ref 0–0.4)
EOSINOPHIL NFR BLD AUTO: 0.4 % (ref 0.3–6.2)
ERYTHROCYTE [DISTWIDTH] IN BLOOD BY AUTOMATED COUNT: 12.6 % (ref 12.3–15.4)
HCT VFR BLD AUTO: 45 % (ref 37.5–51)
HGB BLD-MCNC: 15.5 G/DL (ref 13–17.7)
IMM GRANULOCYTES # BLD AUTO: 0.09 10*3/MM3 (ref 0–0.05)
IMM GRANULOCYTES NFR BLD AUTO: 0.8 % (ref 0–0.5)
LYMPHOCYTES # BLD AUTO: 1.3 10*3/MM3 (ref 0.7–3.1)
LYMPHOCYTES NFR BLD AUTO: 12 % (ref 19.6–45.3)
MCH RBC QN AUTO: 30 PG (ref 26.6–33)
MCHC RBC AUTO-ENTMCNC: 34.4 G/DL (ref 31.5–35.7)
MCV RBC AUTO: 87.2 FL (ref 79–97)
MONOCYTES # BLD AUTO: 0.26 10*3/MM3 (ref 0.1–0.9)
MONOCYTES NFR BLD AUTO: 2.4 % (ref 5–12)
NEUTROPHILS NFR BLD AUTO: 83.9 % (ref 42.7–76)
NEUTROPHILS NFR BLD AUTO: 9.09 10*3/MM3 (ref 1.7–7)
NRBC BLD AUTO-RTO: 0 /100 WBC (ref 0–0.2)
PLATELET # BLD AUTO: 373 10*3/MM3 (ref 140–450)
PMV BLD AUTO: 10.2 FL (ref 6–12)
RBC # BLD AUTO: 5.16 10*6/MM3 (ref 4.14–5.8)
WBC NRBC COR # BLD AUTO: 10.83 10*3/MM3 (ref 3.4–10.8)

## 2025-04-28 PROCEDURE — 99214 OFFICE O/P EST MOD 30 MIN: CPT

## 2025-04-28 PROCEDURE — 71046 X-RAY EXAM CHEST 2 VIEWS: CPT

## 2025-04-28 PROCEDURE — 85025 COMPLETE CBC W/AUTO DIFF WBC: CPT

## 2025-04-28 PROCEDURE — 73130 X-RAY EXAM OF HAND: CPT

## 2025-04-28 PROCEDURE — 36415 COLL VENOUS BLD VENIPUNCTURE: CPT

## 2025-04-28 RX ORDER — CEFDINIR 300 MG/1
300 CAPSULE ORAL 2 TIMES DAILY
Qty: 14 CAPSULE | Refills: 0 | Status: SHIPPED | OUTPATIENT
Start: 2025-04-28 | End: 2025-05-05

## 2025-04-28 RX ORDER — BENZONATATE 100 MG/1
100 CAPSULE ORAL 3 TIMES DAILY PRN
Qty: 21 CAPSULE | Refills: 0 | Status: SHIPPED | OUTPATIENT
Start: 2025-04-28

## 2025-04-28 NOTE — PROGRESS NOTES
Chief Complaint     Shortness of Breath, Cough (Tight in chest, and losing voice ), Dizziness, and Wrist Injury (Fell down stairs due to coughing and dizziness, RIGHT )    Patient or patient representative verbalized consent for the use of Ambient Listening during the visit with  ANJU Barragan for chart documentation. 5/1/2025  15:35 EDT    History of Present Illness     Hong Fraser is a 49 y.o. male who presents to [facility] .    History of Present Illness  The patient presents for evaluation of a persistent cough and right hand and wrist pain.    A persistent cough has been experienced, which occurs regardless of position, whether sitting or lying down, and has significantly disrupted sleep. Tessalon Perles have not been used for several years. The steroid was not started last Wednesday due to the daughter forgetting to bring it from her workplace. The use of Z-Stevenson was initiated this morning. Despite attempts to alleviate the cough with Bromfed and over-the-counter Mucinex, there has been no improvement. White-yellow sputum has been expectorated.    On Saturday morning, a fall down 3 or 4 steps resulted in injuries to the hand, wrist, and knees. The right hand and wrist have been swollen and painful since the fall.         History      Past Medical History:   Diagnosis Date    Arthritis     Congestive heart failure 10/14/2024    [facility] Cardiologist     Depression     Diabetes mellitus     Erectile dysfunction     History of asthma     History of depression     Hyperlipidemia     Hypertension     Low back pain     Lumbosacral disc disease     Obesity     Peripheral neuropathy     Tachycardia        Past Surgical History:   Procedure Laterality Date    EYE SURGERY      Lazer to fix bleeding    HAND SURGERY Left     x2; traumatic injury    HEMORRHOIDECTOMY      Only lancing    HERNIA REPAIR      umbilical    LIPOMA EXCISION      right hip    UMBILICAL  HERNIA REPAIR      WISDOM TOOTH EXTRACTION         Family History   Problem Relation Age of Onset    COPD Mother     Lung cancer Mother     Cancer Mother     Miscarriages / Stillbirths Mother     Hyperlipidemia Father     Hypertension Father     Coronary artery disease Father     COPD Father     Alcohol abuse Father     Arthritis Father     Depression Father     Hearing loss Father     Heart disease Father     Mental illness Father     Diabetes Brother         Current Medications        Current Outpatient Medications:     atorvastatin (LIPITOR) 20 MG tablet, Take 1 tablet by mouth Daily., Disp: 90 tablet, Rfl: 3    azithromycin (Zithromax Z-Stevenson) 250 MG tablet, Take 2 tablets by mouth on day 1, then 1 tablet daily on days 2-5, Disp: 6 tablet, Rfl: 0    Blood Glucose Monitoring Suppl device, Use as directed for blood glucose monitoring, Disp: 1 each, Rfl: 0    chlorthalidone (HYGROTON) 25 MG tablet, Take 1 tablet by mouth Daily., Disp: , Rfl:     Continuous Glucose Sensor (Dexcom G6 Sensor), Use Every 10 (Ten) Days., Disp: 3 each, Rfl: 5    Continuous Glucose Transmitter (Dexcom G6 Transmitter) misc, Use 1 each Every 3 (Three) Months., Disp: 1 each, Rfl: 1    dapagliflozin Propanediol (Farxiga) 10 MG tablet, Take 10 mg by mouth Daily for 180 days., Disp: 90 tablet, Rfl: 1    furosemide (Lasix) 40 MG tablet, Take 1 tablet by mouth 2 (Two) Times a Day As Needed (leg swelling)., Disp: 60 tablet, Rfl: 0    Glucagon (Gvoke HypoPen 2-Pack) 1 MG/0.2ML solution auto-injector, Inject 1 mg under the skin into the appropriate area as directed As Needed (Severe hypoglycemia)., Disp: 0.2 mL, Rfl: 2    glucose blood test strip, Test blood glucose 1-2 times each day for sensor calibrations, Disp: 50 each, Rfl: 5    Insulin Dispos  Accessories (Omnipod Pod Pals) misc, Use 1 each Every 30 (Thirty) Days., Disp: 1 each, Rfl: 11    Insulin Disposable Pump (Omnipod 5 [id number] Pods Gen 5) misc, Use 1 each Every Other Day., Disp: 15 each,  "Rfl: 5    Insulin Lispro (HumaLOG KwikPen) 200 UNIT/ML solution pen-injector, Inject 150 Units under the skin into the appropriate area as directed Daily for 180 days. Maximum daily dose of 200 units per day., Disp: 67 mL, Rfl: 1    Lancets misc, Test blood glucose 1-2 times each day for sensor calibration, Disp: 50 each, Rfl: 5    lisinopril (PRINIVIL,ZESTRIL) 40 MG tablet, Take 1 tablet by mouth Daily., Disp: 90 tablet, Rfl: 3    methylPREDNISolone (MEDROL) 4 MG dose pack, Take as directed on package instructions., Disp: 1 each, Rfl: 0    metoprolol succinate XL (TOPROL-XL) 50 MG 24 hr tablet, Take 1 tablet by mouth 2 (Two) Times a Day., Disp: 60 tablet, Rfl: 5    Testosterone 1.62 % gel, APPLY ONE PUMPS WORTH OF GEL APPLIED TO AXILLA EVERY DAY, Disp: , Rfl:     benzonatate (Tessalon Perles) 100 MG capsule, Take 1 capsule by mouth 3 (Three) Times a Day As Needed for Cough., Disp: 21 capsule, Rfl: 0    cefdinir (OMNICEF) 300 MG capsule, Take 1 capsule by mouth 2 (Two) Times a Day for 7 days., Disp: 14 capsule, Rfl: 0    Current Facility-Administered Medications:     cyanocobalamin injection 1,000 mcg, 1,000 mcg, Intramuscular, Q28 Days, Chewning, Kaylah, APRN, 1,000 mcg at 04/23/25 1553     Allergies     No Known Allergies    Social History       Social History     Social History Narrative    Not on file       Immunizations     Immunization:  Immunization History   Administered Date(s) Administered    Fluzone (or Fluarix & Flulaval for VFC) >6mos 09/28/2021    Influenza, Unspecified 09/28/2021    Tdap 11/16/2018, 11/28/2023, 03/08/2024          Objective     Objective     Vital Signs:   /77 (BP Location: Left arm, Patient Position: Sitting, Cuff Size: Adult)   Pulse 92   Temp 98.7 °F (37.1 °C) (Temporal)   Ht 182.9 cm (72\")   Wt 128 kg (283 lb)   SpO2 99%   BMI 38.38 kg/m²       Physical Exam  Constitutional:       Appearance: Normal appearance.   HENT:      Nose: Nose normal.      Mouth/Throat:      " Mouth: Mucous membranes are moist.   Cardiovascular:      Rate and Rhythm: Normal rate and regular rhythm.      Pulses: Normal pulses.      Heart sounds: Normal heart sounds.   Pulmonary:      Effort: Pulmonary effort is normal.   Skin:     General: Skin is warm and dry.   Neurological:      General: No focal deficit present.      Mental Status: He is alert and oriented to person, place, and time.   Psychiatric:         Mood and Affect: Mood normal.         Behavior: Behavior normal.         Physical Exam  Respiratory: Clear to auscultation, no wheezing, rales or rhonchi  Extremities: Swelling and pain in the right hand and wrist      Results    The following data was reviewed by: ANJU Barragan on 04/28/2025:          Results         Assessment and Plan        Assessment and Plan    Diagnoses and all orders for this visit:    1. Subacute cough (Primary)  -     XR Chest 2 View; Future  -     cefdinir (OMNICEF) 300 MG capsule; Take 1 capsule by mouth 2 (Two) Times a Day for 7 days.  Dispense: 14 capsule; Refill: 0  -     benzonatate (Tessalon Perles) 100 MG capsule; Take 1 capsule by mouth 3 (Three) Times a Day As Needed for Cough.  Dispense: 21 capsule; Refill: 0  -     CBC w AUTO Differential; Future    2. Shortness of breath  -     XR Chest 2 View; Future  -     cefdinir (OMNICEF) 300 MG capsule; Take 1 capsule by mouth 2 (Two) Times a Day for 7 days.  Dispense: 14 capsule; Refill: 0  -     CBC w AUTO Differential; Future    3. Right hand pain  -     XR Hand 3+ View Right; Future  -     CBC w AUTO Differential; Future    4. Laryngitis  -     CBC w AUTO Differential; Future        Assessment & Plan  1. Cough.  - The etiology of the cough could be attributed to either laryngitis or a potential pocket of pneumonia.  - Lungs are clear upon auscultation, with no evidence of wheezing.  - A chest x-ray will be ordered to further investigate the cause of the cough. A complete blood count (CBC) will also be  conducted to monitor his white blood cell count.  - A prescription for cefdinir has been issued, and he is advised to continue with the Z-Stevenson regimen. Tessalon Perles have been prescribed to manage the cough. He is advised to abstain from work for the next 2 days.    2. Right hand and wrist pain.  - Reports swelling and pain in the right hand and wrist following a fall down three or four steps.  - Physical examination reveals swelling and tenderness in the right hand and wrist.  - An x-ray of the hand will be ordered to assess for any fractures or other injuries.  - Pain management and further treatment will be determined based on x-ray results.        Follow Up        Follow Up   Return if symptoms worsen or fail to improve.  Patient was given instructions and counseling regarding his condition or for health maintenance advice. Please see specific information pulled into the AVS if appropriate.

## 2025-04-29 ENCOUNTER — TELEPHONE (OUTPATIENT)
Dept: FAMILY MEDICINE CLINIC | Facility: CLINIC | Age: 50
End: 2025-04-29

## 2025-04-29 NOTE — TELEPHONE ENCOUNTER
Caller: Hong Fraser    Relationship: Self    Best call back number: [phone]     Caller requesting test results: YES     What test was performed: X-RAY, LABS     When was the test performed: 04/28/2025     Where was the test performed: [facility]     Additional notes: PATIENT IS CALLING REQUESTING FOR TEST RESULTS.

## 2025-05-02 DIAGNOSIS — E13.9 LADA (LATENT AUTOIMMUNE DIABETES IN ADULTS), MANAGED AS TYPE 2: ICD-10-CM

## 2025-05-02 RX ORDER — INSULIN LISPRO 200 [IU]/ML
150 INJECTION, SOLUTION SUBCUTANEOUS DAILY
Qty: 67 ML | Refills: 1 | Status: SHIPPED | OUTPATIENT
Start: 2025-05-02 | End: 2025-10-29

## 2025-05-08 RX ORDER — METOPROLOL SUCCINATE 50 MG/1
50 TABLET, EXTENDED RELEASE ORAL 2 TIMES DAILY
Qty: 60 TABLET | Refills: 11 | Status: SHIPPED | OUTPATIENT
Start: 2025-05-08

## 2025-05-21 ENCOUNTER — HOSPITAL ENCOUNTER (EMERGENCY)
Facility: HOSPITAL | Age: 50
Discharge: HOME | End: 2025-05-21
Attending: EMERGENCY MEDICINE | Admitting: EMERGENCY MEDICINE
Payer: COMMERCIAL

## 2025-05-21 ENCOUNTER — APPOINTMENT (OUTPATIENT)
Dept: GENERAL RADIOLOGY | Facility: HOSPITAL | Age: 50
End: 2025-05-21
Payer: COMMERCIAL

## 2025-05-21 ENCOUNTER — TELEPHONE (OUTPATIENT)
Dept: FAMILY MEDICINE CLINIC | Facility: CLINIC | Age: 50
End: 2025-05-21
Payer: COMMERCIAL

## 2025-05-21 VITALS
DIASTOLIC BLOOD PRESSURE: 85 MMHG | HEART RATE: 95 BPM | SYSTOLIC BLOOD PRESSURE: 145 MMHG | WEIGHT: 275.57 LBS | HEIGHT: 72 IN | RESPIRATION RATE: 18 BRPM | BODY MASS INDEX: 37.33 KG/M2 | TEMPERATURE: 98.6 F | OXYGEN SATURATION: 95 %

## 2025-05-21 DIAGNOSIS — J20.9 ACUTE BRONCHITIS WITH WHEEZING: Primary | ICD-10-CM

## 2025-05-21 LAB
ALBUMIN SERPL-MCNC: 4.3 G/DL (ref 3.5–5.2)
ALBUMIN/GLOB SERPL: 1.3 G/DL
ALP SERPL-CCNC: 96 U/L (ref 39–117)
ALT SERPL W P-5'-P-CCNC: 48 U/L (ref 1–41)
ANION GAP SERPL CALCULATED.3IONS-SCNC: 14.1 MMOL/L (ref 5–15)
AST SERPL-CCNC: 38 U/L (ref 1–40)
BASOPHILS # BLD AUTO: 0.04 10*3/MM3 (ref 0–0.2)
BASOPHILS NFR BLD AUTO: 0.5 % (ref 0–1.5)
BILIRUB SERPL-MCNC: 0.4 MG/DL (ref 0–1.2)
BUN SERPL-MCNC: 21 MG/DL (ref 6–20)
BUN/CREAT SERPL: 17.5 (ref 7–25)
CALCIUM SPEC-SCNC: 10.1 MG/DL (ref 8.6–10.5)
CHLORIDE SERPL-SCNC: 103 MMOL/L (ref 98–107)
CO2 SERPL-SCNC: 21.9 MMOL/L (ref 22–29)
CREAT SERPL-MCNC: 1.2 MG/DL (ref 0.76–1.27)
D-LACTATE SERPL-SCNC: 1.3 MMOL/L (ref 0.5–2)
D-LACTATE SERPL-SCNC: 3.2 MMOL/L (ref 0.5–2)
DEPRECATED RDW RBC AUTO: 41.3 FL (ref 37–54)
EGFRCR SERPLBLD CKD-EPI 2021: 74.1 ML/MIN/1.73
EOSINOPHIL # BLD AUTO: 0.22 10*3/MM3 (ref 0–0.4)
EOSINOPHIL NFR BLD AUTO: 3 % (ref 0.3–6.2)
ERYTHROCYTE [DISTWIDTH] IN BLOOD BY AUTOMATED COUNT: 13.4 % (ref 12.3–15.4)
GEN 5 1HR TROPONIN T REFLEX: 28 NG/L
GLOBULIN UR ELPH-MCNC: 3.3 GM/DL
GLUCOSE SERPL-MCNC: 103 MG/DL (ref 65–99)
HCT VFR BLD AUTO: 46.8 % (ref 37.5–51)
HGB BLD-MCNC: 16.2 G/DL (ref 13–17.7)
HOLD SPECIMEN: NORMAL
HOLD SPECIMEN: NORMAL
IMM GRANULOCYTES # BLD AUTO: 0.02 10*3/MM3 (ref 0–0.05)
IMM GRANULOCYTES NFR BLD AUTO: 0.3 % (ref 0–0.5)
LYMPHOCYTES # BLD AUTO: 1.73 10*3/MM3 (ref 0.7–3.1)
LYMPHOCYTES NFR BLD AUTO: 23.3 % (ref 19.6–45.3)
MCH RBC QN AUTO: 29.2 PG (ref 26.6–33)
MCHC RBC AUTO-ENTMCNC: 34.6 G/DL (ref 31.5–35.7)
MCV RBC AUTO: 84.5 FL (ref 79–97)
MONOCYTES # BLD AUTO: 0.95 10*3/MM3 (ref 0.1–0.9)
MONOCYTES NFR BLD AUTO: 12.8 % (ref 5–12)
NEUTROPHILS NFR BLD AUTO: 4.48 10*3/MM3 (ref 1.7–7)
NEUTROPHILS NFR BLD AUTO: 60.1 % (ref 42.7–76)
NRBC BLD AUTO-RTO: 0 /100 WBC (ref 0–0.2)
NT-PROBNP SERPL-MCNC: <36 PG/ML (ref 0–450)
PLATELET # BLD AUTO: 260 10*3/MM3 (ref 140–450)
PMV BLD AUTO: 9.8 FL (ref 6–12)
POTASSIUM SERPL-SCNC: 4.1 MMOL/L (ref 3.5–5.2)
PROT SERPL-MCNC: 7.6 G/DL (ref 6–8.5)
QT INTERVAL: 341 MS
QTC INTERVAL: 436 MS
RBC # BLD AUTO: 5.54 10*6/MM3 (ref 4.14–5.8)
SODIUM SERPL-SCNC: 139 MMOL/L (ref 136–145)
TROPONIN T % DELTA: -3
TROPONIN T NUMERIC DELTA: -1 NG/L
TROPONIN T SERPL HS-MCNC: 29 NG/L
WBC NRBC COR # BLD AUTO: 7.44 10*3/MM3 (ref 3.4–10.8)
WHOLE BLOOD HOLD COAG: NORMAL
WHOLE BLOOD HOLD SPECIMEN: NORMAL

## 2025-05-21 PROCEDURE — 99284 EMERGENCY DEPT VISIT MOD MDM: CPT

## 2025-05-21 PROCEDURE — 71045 X-RAY EXAM CHEST 1 VIEW: CPT

## 2025-05-21 PROCEDURE — 96374 THER/PROPH/DIAG INJ IV PUSH: CPT

## 2025-05-21 PROCEDURE — 85025 COMPLETE CBC W/AUTO DIFF WBC: CPT

## 2025-05-21 PROCEDURE — 36415 COLL VENOUS BLD VENIPUNCTURE: CPT

## 2025-05-21 PROCEDURE — 84484 ASSAY OF TROPONIN QUANT: CPT

## 2025-05-21 PROCEDURE — 25010000002 METHYLPREDNISOLONE PER 125 MG: Performed by: EMERGENCY MEDICINE

## 2025-05-21 PROCEDURE — 84484 ASSAY OF TROPONIN QUANT: CPT | Performed by: EMERGENCY MEDICINE

## 2025-05-21 PROCEDURE — 94799 UNLISTED PULMONARY SVC/PX: CPT

## 2025-05-21 PROCEDURE — 83605 ASSAY OF LACTIC ACID: CPT | Performed by: EMERGENCY MEDICINE

## 2025-05-21 PROCEDURE — 83605 ASSAY OF LACTIC ACID: CPT

## 2025-05-21 PROCEDURE — 94640 AIRWAY INHALATION TREATMENT: CPT

## 2025-05-21 PROCEDURE — 83880 ASSAY OF NATRIURETIC PEPTIDE: CPT

## 2025-05-21 PROCEDURE — 93005 ELECTROCARDIOGRAM TRACING: CPT | Performed by: EMERGENCY MEDICINE

## 2025-05-21 PROCEDURE — 80053 COMPREHEN METABOLIC PANEL: CPT

## 2025-05-21 PROCEDURE — 63710000001 INSULIN REGULAR HUMAN PER 5 UNITS: Performed by: EMERGENCY MEDICINE

## 2025-05-21 PROCEDURE — 93005 ELECTROCARDIOGRAM TRACING: CPT

## 2025-05-21 RX ORDER — ALBUTEROL SULFATE 0.83 MG/ML
2.5 SOLUTION RESPIRATORY (INHALATION) EVERY 4 HOURS PRN
Qty: 50 EACH | Refills: 0 | Status: SHIPPED | OUTPATIENT
Start: 2025-05-21

## 2025-05-21 RX ORDER — HYDROCODONE POLISTIREX AND CHLORPHENIRAMINE POLISTIREX 10; 8 MG/5ML; MG/5ML
5 SUSPENSION, EXTENDED RELEASE ORAL EVERY 12 HOURS PRN
Qty: 100 ML | Refills: 0 | Status: SHIPPED | OUTPATIENT
Start: 2025-05-21 | End: 2025-05-21

## 2025-05-21 RX ORDER — PREDNISONE 20 MG/1
60 TABLET ORAL DAILY
Qty: 15 TABLET | Refills: 0 | Status: SHIPPED | OUTPATIENT
Start: 2025-05-21

## 2025-05-21 RX ORDER — IPRATROPIUM BROMIDE AND ALBUTEROL SULFATE 2.5; .5 MG/3ML; MG/3ML
3 SOLUTION RESPIRATORY (INHALATION) ONCE
Status: COMPLETED | OUTPATIENT
Start: 2025-05-21 | End: 2025-05-21

## 2025-05-21 RX ORDER — SODIUM CHLORIDE 0.9 % (FLUSH) 0.9 %
10 SYRINGE (ML) INJECTION AS NEEDED
Status: DISCONTINUED | OUTPATIENT
Start: 2025-05-21 | End: 2025-05-21 | Stop reason: HOSPADM

## 2025-05-21 RX ORDER — HYDROCODONE POLISTIREX AND CHLORPHENIRAMINE POLISTIREX 10; 8 MG/5ML; MG/5ML
5 SUSPENSION, EXTENDED RELEASE ORAL EVERY 12 HOURS PRN
Qty: 115 ML | Refills: 0 | Status: SHIPPED | OUTPATIENT
Start: 2025-05-21

## 2025-05-21 RX ADMIN — IPRATROPIUM BROMIDE AND ALBUTEROL SULFATE 3 ML: .5; 3 SOLUTION RESPIRATORY (INHALATION) at 08:28

## 2025-05-21 RX ADMIN — INSULIN HUMAN 7 UNITS: 100 INJECTION, SOLUTION PARENTERAL at 09:52

## 2025-05-21 RX ADMIN — METHYLPREDNISOLONE SODIUM SUCCINATE 125 MG: 125 INJECTION INTRAMUSCULAR; INTRAVENOUS at 09:54

## 2025-05-21 NOTE — Clinical Note
[facility] EMERGENCY ROOM  [address] [place] [address]  Phone: [phone]  Fax: [phone]    Hong Fraser was seen and treated in our emergency department on 5/21/2025.  He may return to work on 05/24/2025.         Thank you for choosing [facility].    Sloan Magana MD

## 2025-05-21 NOTE — Clinical Note
[facility] EMERGENCY ROOM  [address] [place] [address]  Phone: [phone]  Fax: [phone]    Hong Fraser was seen and treated in our emergency department on 5/21/2025.  He may return to work on 05/24/2025.         Thank you for choosing [facility].    Sloan Magana MD

## 2025-05-21 NOTE — ED PROVIDER NOTES
Time: 8:14 AM EDT  Date of encounter:  5/21/2025  Independent Historian/Clinical History and Information was obtained by:   Patient and Family    History is limited by: N/A    Chief Complaint: Cough      History of Present Illness:  Patient is a 49 y.o. year old male who presents to the emergency department for evaluation of persistent cough despite treatment.      Patient Care Team  Primary Care Provider: Krystyna Collins APRN    Past Medical History:     No Known Allergies  Past Medical History:   Diagnosis Date    Arthritis     Congestive heart failure 10/14/2024    [facility] Cardiologist     Depression     Diabetes mellitus     Erectile dysfunction     History of asthma     History of depression     Hyperlipidemia     Hypertension     Low back pain     Lumbosacral disc disease     Obesity     Peripheral neuropathy     Tachycardia      Past Surgical History:   Procedure Laterality Date    EYE SURGERY      Lazer to fix bleeding    HAND SURGERY Left     x2; traumatic injury    HEMORRHOIDECTOMY      Only lancing    HERNIA REPAIR      umbilical    LIPOMA EXCISION      right hip    UMBILICAL HERNIA REPAIR      WISDOM TOOTH EXTRACTION       Family History   Problem Relation Age of Onset    COPD Mother     Lung cancer Mother     Cancer Mother     Miscarriages / Stillbirths Mother     Hyperlipidemia Father     Hypertension Father     Coronary artery disease Father     COPD Father     Alcohol abuse Father     Arthritis Father     Depression Father     Hearing loss Father     Heart disease Father     Mental illness Father     Diabetes Brother        Home Medications:  Prior to Admission medications    Medication Sig Start Date End Date Taking? Authorizing Provider   Insulin Lispro (HumaLOG KwikPen) 200 UNIT/ML solution pen-injector Inject 150 Units under the skin into the appropriate area as directed Daily for 180 days. Maximum daily dose of 200 units per day. 5/2/25 10/29/25  Jenna Marie APRN    atorvastatin (LIPITOR) 20 MG tablet Take 1 tablet by mouth Daily. 7/29/24   Krystyna Collins APRN   azithromycin (Zithromax Z-Stevenson) 250 MG tablet Take 2 tablets by mouth on day 1, then 1 tablet daily on days 2-5 4/23/25   Kaylah Steinberg APRN   benzonatate (Tessalon Perles) 100 MG capsule Take 1 capsule by mouth 3 (Three) Times a Day As Needed for Cough. 4/28/25   Kaylah Steinberg APRN   Blood Glucose Monitoring Suppl device Use as directed for blood glucose monitoring 1/23/25   Jenna Marie APRN   chlorthalidone (HYGROTON) 25 MG tablet Take 1 tablet by mouth Daily.    Provider, MD Zhou   Continuous Glucose Sensor (Dexcom G6 Sensor) Use Every 10 (Ten) Days. 4/18/25   Elvis Daniel APRN   Continuous Glucose Transmitter (Dexcom G6 Transmitter) misc Use 1 each Every 3 (Three) Months. 4/18/25   Elvis Daniel APRN   dapagliflozin Propanediol (Farxiga) 10 MG tablet Take 10 mg by mouth Daily for 180 days. 12/6/24 6/4/25  Jenna Marie APRN   furosemide (Lasix) 40 MG tablet Take 1 tablet by mouth 2 (Two) Times a Day As Needed (leg swelling). 7/31/24   Kaylah Steinberg APRN   Glucagon (Gvoke HypoPen 2-Pack) 1 MG/0.2ML solution auto-injector Inject 1 mg under the skin into the appropriate area as directed As Needed (Severe hypoglycemia). 1/4/24   Jenna Marie APRN   glucose blood test strip Test blood glucose 1-2 times each day for sensor calibrations 1/23/25   Jenna Marie APRN   Insulin Dispos  Accessories (Omnipod Pod Pals) misc Use 1 each Every 30 (Thirty) Days. 12/4/23   Charly Ortega DO   Insulin Disposable Pump (Omnipod 5 [id number] Pods Gen 5) misc Use 1 each Every Other Day. 12/6/24   Jenna Marie APRN   Lancets misc Test blood glucose 1-2 times each day for sensor calibration 1/23/25   Jenna Marie APRN   lisinopril (PRINIVIL,ZESTRIL) 40 MG tablet Take 1 tablet by mouth Daily. 7/31/24   Kaylah Steinberg APRN   methylPREDNISolone  "(MEDROL) 4 MG dose pack Take as directed on package instructions. 4/23/25   Kaylah Steinberg APRN   metoprolol succinate XL (TOPROL-XL) 50 MG 24 hr tablet TAKE ONE TABLET BY MOUTH TWICE DAILY 5/8/25   Kaylah Steinberg APRN   Testosterone 1.62 % gel APPLY ONE PUMPS WORTH OF GEL APPLIED TO AXILLA EVERY DAY 9/11/23   Provider, MD Zhou        Social History:   Social History     Tobacco Use    Smoking status: Former     Current packs/day: 0.00     Average packs/day: 1 pack/day for 20.0 years (20.0 ttl pk-yrs)     Types: Cigarettes     Start date: 1/1/1990     Quit date: 2010     Years since quitting: 15.3     Passive exposure: Never    Smokeless tobacco: Former    Tobacco comments:     Off and on   Vaping Use    Vaping status: Never Used   Substance Use Topics    Alcohol use: Yes     Alcohol/week: 1.0 standard drink of alcohol     Types: 1 Cans of beer per week     Comment: Social    Drug use: Never         Review of Systems:  Review of Systems   Constitutional:  Negative for chills and fever.   HENT:  Negative for congestion, rhinorrhea and sore throat.    Eyes:  Negative for pain and visual disturbance.   Respiratory:  Positive for cough, shortness of breath and wheezing. Negative for apnea and chest tightness.    Cardiovascular:  Negative for chest pain and palpitations.   Gastrointestinal:  Negative for abdominal pain, diarrhea, nausea and vomiting.   Genitourinary:  Negative for difficulty urinating and dysuria.   Musculoskeletal:  Negative for joint swelling and myalgias.   Skin:  Negative for color change.   Neurological:  Negative for seizures and headaches.   Psychiatric/Behavioral: Negative.     All other systems reviewed and are negative.       Physical Exam:  /68 (BP Location: Right arm, Patient Position: Lying)   Pulse 104   Temp 98.2 °F (36.8 °C) (Oral)   Resp 18   Ht 182.9 cm (72\")   Wt 125 kg (275 lb 9.2 oz)   SpO2 94%   BMI 37.37 kg/m²     Physical Exam  Vitals and nursing note " reviewed.   Constitutional:       General: He is not in acute distress.     Appearance: Normal appearance. He is not toxic-appearing.   HENT:      Head: Normocephalic and atraumatic.      Jaw: There is normal jaw occlusion.   Eyes:      General: Lids are normal.      Extraocular Movements: Extraocular movements intact.      Conjunctiva/sclera: Conjunctivae normal.      Pupils: Pupils are equal, round, and reactive to light.   Cardiovascular:      Rate and Rhythm: Regular rhythm. Tachycardia present.      Pulses: Normal pulses.      Heart sounds: Normal heart sounds.   Pulmonary:      Effort: Pulmonary effort is normal. No respiratory distress.      Breath sounds: Wheezing present. No rhonchi.   Abdominal:      General: Abdomen is flat.      Palpations: Abdomen is soft.      Tenderness: There is no abdominal tenderness. There is no guarding or rebound.   Musculoskeletal:         General: Normal range of motion.      Cervical back: Normal range of motion and neck supple.      Right lower leg: No edema.      Left lower leg: No edema.   Skin:     General: Skin is warm and dry.   Neurological:      Mental Status: He is alert and oriented to person, place, and time. Mental status is at baseline.   Psychiatric:         Mood and Affect: Mood normal.                    Medical Decision Making:      Comorbidities that affect care:    Diabetes, hypertension    External Notes reviewed:    Previous Clinic Note: Family medicine office visit for general medical management      The following orders were placed and all results were independently analyzed by me:  Orders Placed This Encounter   Procedures    Home Nebulizer    XR Chest 1 View    Felt Draw    Comprehensive Metabolic Panel    BNP    High Sensitivity Troponin T    CBC Auto Differential    Lactic Acid, Plasma    High Sensitivity Troponin T 1Hr    STAT Lactic Acid, Reflex    NPO Diet NPO Type: Strict NPO    Undress & Gown    Continuous Pulse Oximetry    Vital Signs     Oxygen Therapy- Nasal Cannula; Titrate 1-6 LPM Per SpO2; 90 - 95%    ECG 12 Lead ED Triage Standing Order; SOA    Insert Peripheral IV    CBC & Differential    Green Top (Gel)    Lavender Top    Gold Top - SST    Light Blue Top       Medications Given in the Emergency Department:  Medications   sodium chloride 0.9 % flush 10 mL (has no administration in time range)   ipratropium-albuterol (DUO-NEB) nebulizer solution 3 mL (3 mL Nebulization Given 5/21/25 0828)   methylPREDNISolone sodium succinate (SOLU-Medrol) 125 mg in sterile water (preservative free) 2 mL (125 mg Intravenous Given 5/21/25 0954)   insulin regular (humuLIN R,novoLIN R) injection 7 Units (7 Units Intravenous Given 5/21/25 0952)        ED Course:         Labs:    Lab Results (last 24 hours)       Procedure Component Value Units Date/Time    CBC & Differential [[id number]]  (Abnormal) Collected: 05/21/25 0432    Specimen: Blood Updated: 05/21/25 0452    Narrative:      The following orders were created for panel order CBC & Differential.  Procedure                               Abnormality         Status                     ---------                               -----------         ------                     CBC Auto Differential[[id number]]        Abnormal            Final result                 Please view results for these tests on the individual orders.    Comprehensive Metabolic Panel [[id number]]  (Abnormal) Collected: 05/21/25 0432    Specimen: Blood Updated: 05/21/25 0515     Glucose 103 mg/dL      BUN 21 mg/dL      Creatinine 1.20 mg/dL      Sodium 139 mmol/L      Potassium 4.1 mmol/L      Comment: Slight hemolysis detected by analyzer. Result may be falsely elevated.        Chloride 103 mmol/L      CO2 21.9 mmol/L      Calcium 10.1 mg/dL      Total Protein 7.6 g/dL      Albumin 4.3 g/dL      ALT (SGPT) 48 U/L      AST (SGOT) 38 U/L      Alkaline Phosphatase 96 U/L      Total Bilirubin 0.4 mg/dL      Globulin 3.3 gm/dL      A/G Ratio 1.3 g/dL       BUN/Creatinine Ratio 17.5     Anion Gap 14.1 mmol/L      eGFR 74.1 mL/min/1.73     Narrative:      GFR Categories in Chronic Kidney Disease (CKD)              GFR Category          GFR (mL/min/1.73)    Interpretation  G1                    90 or greater        Normal or high (1)  G2                    60-89                Mild decrease (1)  G3a                   45-59                Mild to moderate decrease  G3b                   30-44                Moderate to severe decrease  G4                    15-29                Severe decrease  G5                    14 or less           Kidney failure    (1)In the absence of evidence of kidney disease, neither GFR category G1 or G2 fulfill the criteria for CKD.    eGFR calculation 2021 CKD-EPI creatinine equation, which does not include race as a factor    BNP [[id number]]  (Normal) Collected: 05/21/25 0432    Specimen: Blood Updated: 05/21/25 0513     proBNP <36.0 pg/mL     Narrative:      This assay is used as an aid in the diagnosis of individuals suspected of having heart failure. It can be used as an aid in the diagnosis of acute decompensated heart failure (ADHF) in patients presenting with signs and symptoms of ADHF to the emergency department (ED). In addition, NT-proBNP of <300 pg/mL indicates ADHF is not likely.    Age Range Result Interpretation  NT-proBNP Concentration (pg/mL:      <50             Positive            >450                   Gray                 300-450                    Negative             <300    50-75           Positive            >900                  Gray                300-900                  Negative            <300      >75             Positive            >1800                  Gray                300-1800                  Negative            <300    High Sensitivity Troponin T [[id number]]  (Abnormal) Collected: 05/21/25 0432    Specimen: Blood Updated: 05/21/25 0515     HS Troponin T 29 ng/L     Narrative:      High Sensitive  Troponin T Reference Range:  <14.0 ng/L- Negative Female for AMI  <22.0 ng/L- Negative Male for AMI  >=14 - Abnormal Female indicating possible myocardial injury.  >=22 - Abnormal Male indicating possible myocardial injury.   Clinicians would have to utilize clinical acumen, EKG, Troponin, and serial changes to determine if it is an Acute Myocardial Infarction or myocardial injury due to an underlying chronic condition.         CBC Auto Differential [[id number]]  (Abnormal) Collected: 05/21/25 0432    Specimen: Blood Updated: 05/21/25 0452     WBC 7.44 10*3/mm3      RBC 5.54 10*6/mm3      Hemoglobin 16.2 g/dL      Hematocrit 46.8 %      MCV 84.5 fL      MCH 29.2 pg      MCHC 34.6 g/dL      RDW 13.4 %      RDW-SD 41.3 fl      MPV 9.8 fL      Platelets 260 10*3/mm3      Neutrophil % 60.1 %      Lymphocyte % 23.3 %      Monocyte % 12.8 %      Eosinophil % 3.0 %      Basophil % 0.5 %      Immature Grans % 0.3 %      Neutrophils, Absolute 4.48 10*3/mm3      Lymphocytes, Absolute 1.73 10*3/mm3      Monocytes, Absolute 0.95 10*3/mm3      Eosinophils, Absolute 0.22 10*3/mm3      Basophils, Absolute 0.04 10*3/mm3      Immature Grans, Absolute 0.02 10*3/mm3      nRBC 0.0 /100 WBC     Lactic Acid, Plasma [[id number]]  (Abnormal) Collected: 05/21/25 0432    Specimen: Blood Updated: 05/21/25 0524     Lactate 3.2 mmol/L     High Sensitivity Troponin T 1Hr [[id number]]  (Abnormal) Collected: 05/21/25 0647    Specimen: Blood Updated: 05/21/25 0716     HS Troponin T 28 ng/L      Troponin T Numeric Delta -1 ng/L      Troponin T % Delta -3    Narrative:      High Sensitive Troponin T Reference Range:  <14.0 ng/L- Negative Female for AMI  <22.0 ng/L- Negative Male for AMI  >=14 - Abnormal Female indicating possible myocardial injury.  >=22 - Abnormal Male indicating possible myocardial injury.   Clinicians would have to utilize clinical acumen, EKG, Troponin, and serial changes to determine if it is an Acute Myocardial Infarction or  myocardial injury due to an underlying chronic condition.         STAT Lactic Acid, Reflex [[id number]]  (Normal) Collected: 05/21/25 0757    Specimen: Blood from Arm, Left Updated: 05/21/25 0823     Lactate 1.3 mmol/L              Imaging:    XR Chest 1 View  Result Date: 5/21/2025  XR CHEST 1 VW-  Date of exam: 5/21/2025 4:35 AM.  Comparisons: 4/28/2025; 5/15/2022; 12/4/2020; 5/16/2016.  INDICATIONS: 49-year-old male w/ h/o SOA/SOB/shortness of air/shortness of breath; cough; bronchitis/pneumonia.  FINDINGS: A single AP (or PA) upright portable chest radiograph was performed. No cardiac enlargement is seen. No acute infiltrate is appreciated. No pleural effusion or pneumothorax is identified. No significant interval change is seen since the prior study (or studies). If symptoms or clinical concerns persist, consider imaging follow-up (such as with chest CT examination).       No acute infiltrate is appreciated.    Portions of this note were completed with a voice recognition program.  5/21/2025 4:46 AM by Hong Montes MD on Workstation: [id number]          Differential Diagnosis and Discussion:    Cough: Differential diagnosis includes but is not limited to pneumonia, acute bronchitis, upper respiratory infection, ACE inhibitor use, allergic reaction, epiglottitis, seasonal allergies, chemical irritants, exercise-induced asthma, viral syndrome.    PROCEDURES:    Labs were collected in the emergency department and all labs were reviewed and interpreted by me.  X-ray were performed in the emergency department and all X-ray impressions were independently interpreted by me.  An EKG was performed and the EKG was interpreted by me.    ECG 12 Lead ED Triage Standing Order; SOA   Preliminary Result   HEART RATE=98  bpm   RR Jmtmpbvy=990  ms   MO Cxpwmfyb=909  ms   P Horizontal Axis=-2  deg   P Front Axis=47  deg   QRSD Interval=91  ms   QT Habtwfqi=413  ms   SRaT=574  ms   QRS Axis=2  deg   T Wave Axis=31  deg   - ABNORMAL  ECG -   Sinus rhythm   Low voltage, precordial leads   Probable anteroseptal infarct, old   Date and Time of Study:2025-05-21 04:20:37        Interpretation of the EKG shows normal sinus rhythm, normal rate, normal QT, no acute ischemia    Procedures    MDM  Number of Diagnoses or Management Options  Acute bronchitis with wheezing  Diagnosis management comments: Insert me this is a 49-year-old male patient who presents to the Emergency Department for evaluation of shortness of breath and cough.  He is also wheezing on examination he has been given steroids and breathing treatment.  He is diabetic and since he was given steroids he was also given insulin.  Chest x-ray reviewed by me is unremarkable negative for acute pathology.  CBC independently reviewed and interpreted by me and shows no critical abnormalities.  CMP independently reviewed and interpreted by me and shows no critical abnormalities.  Very strict return to ER and follow-up instructions have been provided to the patient.  Prescription nebulizer, albuterol solution, prednisone and cough medicine prescribed.                       Patient Care Considerations:    ANTIBIOTICS: I considered prescribing antibiotics as an outpatient however no bacterial focus of infection was found.      Consultants/Shared Management Plan:    None    Social Determinants of Health:    Patient is independent, reliable, and has access to care.       Disposition and Care Coordination:    Discharged: I considered escalation of care by admitting this patient to the hospital, however no respiratory failure or hypoxia    I have explained the patient´s condition, diagnoses and treatment plan based on the information available to me at this time. I have answered questions and addressed any concerns. The patient has a good  understanding of the patient´s diagnosis, condition, and treatment plan as can be expected at this point. The vital signs have been stable. The patient´s condition is  stable and appropriate for discharge from the emergency department.      The patient will pursue further outpatient evaluation with the primary care physician or other designated or consulting physician as outlined in the discharge instructions. They are agreeable to this plan of care and follow-up instructions have been explained in detail. The patient has received these instructions in written format and has expressed an understanding of the discharge instructions. The patient is aware that any significant change in condition or worsening of symptoms should prompt an immediate return to this or the closest emergency department or call to 911.  I have explained discharge medications and the need for follow up with the patient/caretakers. This was also printed in the discharge instructions. Patient was discharged with the following medications and follow up:      Medication List        New Prescriptions      albuterol (2.5 MG/3ML) 0.083% nebulizer solution  Commonly known as: PROVENTIL  Take 2.5 mg by nebulization Every 4 (Four) Hours As Needed for Wheezing.     Hydrocod Abdirahman-Chlorphe Abdirahman ER 10-8 MG/5ML ER suspension  Commonly known as: TUSSIONEX PENNKINETIC  Take 5 mL by mouth Every 12 (Twelve) Hours As Needed for Cough.     predniSONE 20 MG tablet  Commonly known as: DELTASONE  Take 3 tablets by mouth Daily.               Where to Get Your Medications        [facility] [address] - [phone]  - [phone] [address], [place] [address]      Phone: [phone]   albuterol (2.5 MG/3ML) 0.083% nebulizer solution  Hydrocod Abdirahman-Chlorphe Abdirahman ER 10-8 MG/5ML ER suspension  predniSONE 20 MG tablet      Krystyna Collins, ANJU  [address]  [place] [address] [phone]    In 1 week         Final diagnoses:   Acute bronchitis with wheezing        ED Disposition       ED  Disposition   Discharge    Condition   Stable    Comment   --               This medical record created using voice recognition software.             Sloan Magana MD  05/21/25 1613

## 2025-05-21 NOTE — TELEPHONE ENCOUNTER
Patient seen Kaylah 4/2025 with same symptoms that he was seen in ER for today. They prescribed steroids and nebulizer however patient wants to know if he can have an antibiotic due to this going on for 3 weeks.

## 2025-05-21 NOTE — ED NOTES
RN attempted to give SOLU-Medrol to pt. Pt was concerned that he needed  insulin after the breathing treatment. Med administration was delayed while speaking with provider. Provider gave verbal order for 7 units of regular insulin IV and stated it was okay for pt to eat and drink. RN gave pt food and drink.

## 2025-05-22 NOTE — TELEPHONE ENCOUNTER
Spoke to patient and he is aware. Patient thinks his cpap machine may be causing the sickness. He ordered new supplies

## 2025-06-11 LAB
QT INTERVAL: 341 MS
QTC INTERVAL: 436 MS

## 2025-06-26 ENCOUNTER — TELEPHONE (OUTPATIENT)
Dept: DIABETES SERVICES | Facility: HOSPITAL | Age: 50
End: 2025-06-26

## 2025-06-26 ENCOUNTER — OFFICE VISIT (OUTPATIENT)
Dept: DIABETES SERVICES | Facility: HOSPITAL | Age: 50
End: 2025-06-26
Payer: COMMERCIAL

## 2025-06-26 VITALS
HEART RATE: 83 BPM | HEIGHT: 72 IN | DIASTOLIC BLOOD PRESSURE: 77 MMHG | BODY MASS INDEX: 35.89 KG/M2 | WEIGHT: 265 LBS | SYSTOLIC BLOOD PRESSURE: 144 MMHG | OXYGEN SATURATION: 97 %

## 2025-06-26 DIAGNOSIS — R80.9 LATENT AUTOIMMUNE DIABETES MELLITUS IN ADULT (LADA) WITH MICROALBUMINURIA: ICD-10-CM

## 2025-06-26 DIAGNOSIS — E13.29 LATENT AUTOIMMUNE DIABETES MELLITUS IN ADULT (LADA) WITH MICROALBUMINURIA: ICD-10-CM

## 2025-06-26 DIAGNOSIS — E13.319 LATENT AUTOIMMUNE DIABETES MELLITUS IN ADULT (LADA) WITH DIABETIC RETINOPATHY: ICD-10-CM

## 2025-06-26 DIAGNOSIS — E13.40: ICD-10-CM

## 2025-06-26 DIAGNOSIS — E13.22 LATENT AUTOIMMUNE DIABETES MELLITUS IN ADULT (LADA) WITH CHRONIC KIDNEY DISEASE: ICD-10-CM

## 2025-06-26 DIAGNOSIS — E66.01 SEVERE OBESITY (BMI 35.0-39.9) WITH COMORBIDITY: ICD-10-CM

## 2025-06-26 DIAGNOSIS — Z96.41 INSULIN PUMP IN PLACE: ICD-10-CM

## 2025-06-26 DIAGNOSIS — E13.9 LADA (LATENT AUTOIMMUNE DIABETES IN ADULTS), MANAGED AS TYPE 2: Primary | ICD-10-CM

## 2025-06-26 DIAGNOSIS — Z97.8 USES SELF-APPLIED CONTINUOUS GLUCOSE MONITORING DEVICE: ICD-10-CM

## 2025-06-26 LAB
EXPIRATION DATE: ABNORMAL
GLUCOSE BLDC GLUCOMTR-MCNC: 381 MG/DL (ref 70–99)
HBA1C MFR BLD: 8.6 % (ref 4.5–5.7)
Lab: ABNORMAL

## 2025-06-26 PROCEDURE — 83036 HEMOGLOBIN GLYCOSYLATED A1C: CPT | Performed by: NURSE PRACTITIONER

## 2025-06-26 PROCEDURE — G0463 HOSPITAL OUTPT CLINIC VISIT: HCPCS | Performed by: NURSE PRACTITIONER

## 2025-06-26 PROCEDURE — 82948 REAGENT STRIP/BLOOD GLUCOSE: CPT | Performed by: NURSE PRACTITIONER

## 2025-06-26 RX ORDER — INSULIN PMP CART,AUT,G6/7,CNTR
1 EACH SUBCUTANEOUS EVERY OTHER DAY
Qty: 15 EACH | Refills: 5 | Status: SHIPPED | OUTPATIENT
Start: 2025-06-26

## 2025-06-26 RX ORDER — TIRZEPATIDE 2.5 MG/.5ML
2.5 INJECTION, SOLUTION SUBCUTANEOUS
Qty: 2 ML | Refills: 1 | Status: SHIPPED | OUTPATIENT
Start: 2025-06-26

## 2025-06-26 RX ORDER — DAPAGLIFLOZIN 10 MG/1
10 TABLET, FILM COATED ORAL DAILY
Qty: 90 TABLET | Refills: 1 | Status: SHIPPED | OUTPATIENT
Start: 2025-06-26 | End: 2025-06-27 | Stop reason: CLARIF

## 2025-06-26 NOTE — TELEPHONE ENCOUNTER
Patient's wife contacted office stating insurance is kicking back the Farxiga and stating that Jardiance is the preferred medication.    Stated that Farxiga is going to be $75 and the Jardiance is going to be $40.    Wanted to let provider know and see if the prescription needs to be changed?    If it does have to be changed, they need it sent to [facility] pharmacy listed in patients chart.

## 2025-06-30 NOTE — TELEPHONE ENCOUNTER
Left message for wife with details per provider.    Instructed to call back with any further questions or concerns.

## 2025-07-08 RX ORDER — CHLORTHALIDONE 25 MG/1
25 TABLET ORAL DAILY
Qty: 30 TABLET | Refills: 5 | Status: SHIPPED | OUTPATIENT
Start: 2025-07-08

## 2025-08-21 DIAGNOSIS — E13.9 LADA (LATENT AUTOIMMUNE DIABETES IN ADULTS), MANAGED AS TYPE 2: ICD-10-CM

## 2025-08-21 RX ORDER — INSULIN LISPRO 200 [IU]/ML
INJECTION, SOLUTION SUBCUTANEOUS
Qty: 69 ML | Refills: 1 | Status: SHIPPED | OUTPATIENT
Start: 2025-08-21

## 2025-08-25 RX ORDER — TIRZEPATIDE 5 MG/.5ML
5 INJECTION, SOLUTION SUBCUTANEOUS
Qty: 2 ML | Refills: 5 | Status: SHIPPED | OUTPATIENT
Start: 2025-08-25